# Patient Record
Sex: FEMALE | Race: WHITE | Employment: UNEMPLOYED | URBAN - METROPOLITAN AREA
[De-identification: names, ages, dates, MRNs, and addresses within clinical notes are randomized per-mention and may not be internally consistent; named-entity substitution may affect disease eponyms.]

---

## 2021-08-31 ENCOUNTER — HOSPITAL ENCOUNTER (INPATIENT)
Age: 61
LOS: 1 days | Discharge: HOME HEALTH | DRG: 177 | End: 2021-09-02
Attending: EMERGENCY MEDICINE | Admitting: EMERGENCY MEDICINE
Payer: OTHER GOVERNMENT

## 2021-08-31 ENCOUNTER — APPOINTMENT (OUTPATIENT)
Dept: GENERAL RADIOLOGY | Age: 61
DRG: 177 | End: 2021-08-31
Attending: STUDENT IN AN ORGANIZED HEALTH CARE EDUCATION/TRAINING PROGRAM
Payer: OTHER GOVERNMENT

## 2021-08-31 ENCOUNTER — APPOINTMENT (OUTPATIENT)
Dept: CT IMAGING | Age: 61
DRG: 177 | End: 2021-08-31
Attending: STUDENT IN AN ORGANIZED HEALTH CARE EDUCATION/TRAINING PROGRAM
Payer: OTHER GOVERNMENT

## 2021-08-31 DIAGNOSIS — R41.82 ALTERED MENTAL STATUS, UNSPECIFIED ALTERED MENTAL STATUS TYPE: ICD-10-CM

## 2021-08-31 DIAGNOSIS — U07.1 COVID-19: Primary | ICD-10-CM

## 2021-08-31 LAB
ALBUMIN SERPL-MCNC: 3.1 G/DL (ref 3.2–4.6)
ALBUMIN/GLOB SERPL: 0.7 {RATIO} (ref 1.2–3.5)
ALP SERPL-CCNC: 55 U/L (ref 50–136)
ALT SERPL-CCNC: 83 U/L (ref 12–65)
ANION GAP SERPL CALC-SCNC: 8 MMOL/L (ref 7–16)
AST SERPL-CCNC: 52 U/L (ref 15–37)
BASOPHILS # BLD: 0 K/UL (ref 0–0.2)
BASOPHILS NFR BLD: 0 % (ref 0–2)
BILIRUB SERPL-MCNC: 0.6 MG/DL (ref 0.2–1.1)
BUN SERPL-MCNC: 11 MG/DL (ref 8–23)
CALCIUM SERPL-MCNC: 8.7 MG/DL (ref 8.3–10.4)
CHLORIDE SERPL-SCNC: 107 MMOL/L (ref 98–107)
CO2 SERPL-SCNC: 23 MMOL/L (ref 21–32)
COVID-19 RAPID TEST, COVR: DETECTED
CREAT SERPL-MCNC: 0.8 MG/DL (ref 0.6–1)
DIFFERENTIAL METHOD BLD: NORMAL
EOSINOPHIL # BLD: 0.2 K/UL (ref 0–0.8)
EOSINOPHIL NFR BLD: 3 % (ref 0.5–7.8)
ERYTHROCYTE [DISTWIDTH] IN BLOOD BY AUTOMATED COUNT: 12.7 % (ref 11.9–14.6)
GLOBULIN SER CALC-MCNC: 4.4 G/DL (ref 2.3–3.5)
GLUCOSE BLD STRIP.AUTO-MCNC: 124 MG/DL (ref 65–100)
GLUCOSE SERPL-MCNC: 120 MG/DL (ref 65–100)
HCT VFR BLD AUTO: 40.8 % (ref 35.8–46.3)
HGB BLD-MCNC: 14 G/DL (ref 11.7–15.4)
IMM GRANULOCYTES # BLD AUTO: 0.1 K/UL (ref 0–0.5)
IMM GRANULOCYTES NFR BLD AUTO: 2 % (ref 0–5)
LACTATE SERPL-SCNC: 3.1 MMOL/L (ref 0.4–2)
LYMPHOCYTES # BLD: 1.1 K/UL (ref 0.5–4.6)
LYMPHOCYTES NFR BLD: 20 % (ref 13–44)
MCH RBC QN AUTO: 30.6 PG (ref 26.1–32.9)
MCHC RBC AUTO-ENTMCNC: 34.3 G/DL (ref 31.4–35)
MCV RBC AUTO: 89.3 FL (ref 79.6–97.8)
MONOCYTES # BLD: 0.4 K/UL (ref 0.1–1.3)
MONOCYTES NFR BLD: 7 % (ref 4–12)
NEUTS SEG # BLD: 3.6 K/UL (ref 1.7–8.2)
NEUTS SEG NFR BLD: 68 % (ref 43–78)
NRBC # BLD: 0 K/UL (ref 0–0.2)
PLATELET # BLD AUTO: 396 K/UL (ref 150–450)
PMV BLD AUTO: 10.3 FL (ref 9.4–12.3)
POTASSIUM SERPL-SCNC: 3 MMOL/L (ref 3.5–5.1)
PROT SERPL-MCNC: 7.5 G/DL (ref 6.3–8.2)
RBC # BLD AUTO: 4.57 M/UL (ref 4.05–5.2)
SARS-COV-2, COV2: NORMAL
SERVICE CMNT-IMP: ABNORMAL
SODIUM SERPL-SCNC: 138 MMOL/L (ref 136–145)
SOURCE, COVRS: ABNORMAL
TSH SERPL DL<=0.005 MIU/L-ACNC: 2.25 UIU/ML (ref 0.36–3.74)
WBC # BLD AUTO: 5.3 K/UL (ref 4.3–11.1)

## 2021-08-31 PROCEDURE — 80053 COMPREHEN METABOLIC PANEL: CPT

## 2021-08-31 PROCEDURE — 84145 PROCALCITONIN (PCT): CPT

## 2021-08-31 PROCEDURE — 83605 ASSAY OF LACTIC ACID: CPT

## 2021-08-31 PROCEDURE — 71045 X-RAY EXAM CHEST 1 VIEW: CPT

## 2021-08-31 PROCEDURE — 74011250636 HC RX REV CODE- 250/636: Performed by: EMERGENCY MEDICINE

## 2021-08-31 PROCEDURE — 81003 URINALYSIS AUTO W/O SCOPE: CPT

## 2021-08-31 PROCEDURE — 93005 ELECTROCARDIOGRAM TRACING: CPT | Performed by: STUDENT IN AN ORGANIZED HEALTH CARE EDUCATION/TRAINING PROGRAM

## 2021-08-31 PROCEDURE — 87635 SARS-COV-2 COVID-19 AMP PRB: CPT

## 2021-08-31 PROCEDURE — 70450 CT HEAD/BRAIN W/O DYE: CPT

## 2021-08-31 PROCEDURE — 93005 ELECTROCARDIOGRAM TRACING: CPT | Performed by: EMERGENCY MEDICINE

## 2021-08-31 PROCEDURE — 96374 THER/PROPH/DIAG INJ IV PUSH: CPT

## 2021-08-31 PROCEDURE — 99284 EMERGENCY DEPT VISIT MOD MDM: CPT

## 2021-08-31 PROCEDURE — 85025 COMPLETE CBC W/AUTO DIFF WBC: CPT

## 2021-08-31 PROCEDURE — 96365 THER/PROPH/DIAG IV INF INIT: CPT

## 2021-08-31 PROCEDURE — 82962 GLUCOSE BLOOD TEST: CPT

## 2021-08-31 PROCEDURE — 87040 BLOOD CULTURE FOR BACTERIA: CPT

## 2021-08-31 PROCEDURE — 84443 ASSAY THYROID STIM HORMONE: CPT

## 2021-08-31 RX ORDER — TRAZODONE HYDROCHLORIDE 50 MG/1
TABLET ORAL
COMMUNITY
Start: 2021-07-12

## 2021-08-31 RX ORDER — DEXAMETHASONE SODIUM PHOSPHATE 100 MG/10ML
6 INJECTION INTRAMUSCULAR; INTRAVENOUS
Status: COMPLETED | OUTPATIENT
Start: 2021-08-31 | End: 2021-08-31

## 2021-08-31 RX ORDER — PAROXETINE HYDROCHLORIDE 20 MG/1
20 TABLET, FILM COATED ORAL DAILY
COMMUNITY

## 2021-08-31 RX ORDER — LOSARTAN POTASSIUM 50 MG/1
50 TABLET ORAL DAILY
COMMUNITY
Start: 2021-07-12 | End: 2021-09-02

## 2021-08-31 RX ORDER — CELECOXIB 100 MG/1
CAPSULE ORAL
COMMUNITY
Start: 2021-07-12

## 2021-08-31 RX ORDER — LEVOTHYROXINE SODIUM 88 UG/1
TABLET ORAL
COMMUNITY
Start: 2021-07-12

## 2021-08-31 RX ORDER — HYDROCHLOROTHIAZIDE 25 MG/1
25 TABLET ORAL DAILY
COMMUNITY
Start: 2021-07-12 | End: 2021-09-02

## 2021-08-31 RX ORDER — METOCLOPRAMIDE 10 MG/1
10 TABLET ORAL
COMMUNITY

## 2021-08-31 RX ORDER — OMEPRAZOLE 40 MG/1
40 CAPSULE, DELAYED RELEASE ORAL DAILY
COMMUNITY
Start: 2021-07-12

## 2021-08-31 RX ORDER — ESTRADIOL 1 MG/1
1 TABLET ORAL DAILY
COMMUNITY

## 2021-08-31 RX ADMIN — DEXAMETHASONE SODIUM PHOSPHATE 6 MG: 10 INJECTION INTRAMUSCULAR; INTRAVENOUS at 23:59

## 2021-08-31 RX ADMIN — SODIUM CHLORIDE 1000 ML: 900 INJECTION, SOLUTION INTRAVENOUS at 23:58

## 2021-08-31 NOTE — ED PROVIDER NOTES
Provider in triage: 79-year-old female patient brought in for evaluation by her daughter who reports altered mentation starting yesterday. Patient was last seen acting normally yesterday morning. Around lunchtime she was evaluated by family again where she appeared confused. She has been suffering from cough, congestion, nausea, intermittent stomach cramping with concern for COVID-19. She has had fevers off and on here recently. She lives with her son who is currently Covid positive. Patient's confusion progressive today concerning family for stroke and prompting their visit to this department. Patient is able to give her name at this time, family states speech is normal.  Patient denies numbness tingling or weakness at this time. She reports generalized ill feeling. Poor historian. Started greater than 24 hours ago. She has no evidence of signs of stroke on exam.  We will proceed with altered mentation work-up, PCR/rapid testing as she may be a candidate for Regeneron. The history is provided by the patient and a relative. No  was used. Altered mental status   This is a new problem. The current episode started yesterday. The problem has been gradually worsening. Associated symptoms include confusion and weakness. Pertinent negatives include no numbness. Mental status baseline is normal.  Her past medical history is significant for hypertension. Her past medical history does not include diabetes or heart disease. No past medical history on file. No past surgical history on file. No family history on file.     Social History     Socioeconomic History    Marital status: Not on file     Spouse name: Not on file    Number of children: Not on file    Years of education: Not on file    Highest education level: Not on file   Occupational History    Not on file   Tobacco Use    Smoking status: Not on file   Substance and Sexual Activity    Alcohol use: Not on file    Drug use: Not on file    Sexual activity: Not on file   Other Topics Concern    Not on file   Social History Narrative    Not on file     Social Determinants of Health     Financial Resource Strain:     Difficulty of Paying Living Expenses:    Food Insecurity:     Worried About Running Out of Food in the Last Year:     [address] in the Last Year:    Transportation Needs:     Lack of Transportation (Medical):  Lack of Transportation (Non-Medical):    Physical Activity:     Days of Exercise per Week:     Minutes of Exercise per Session:    Stress:     Feeling of Stress :    Social Connections:     Frequency of Communication with Friends and Family:     Frequency of Social Gatherings with Friends and Family:     Attends Protestant Services:     Active Member of Clubs or Organizations:     Attends Club or Organization Meetings:     Marital Status:    Intimate Partner Violence:     Fear of Current or Ex-Partner:     Emotionally Abused:     Physically Abused:     Sexually Abused: ALLERGIES: Patient has no known allergies. Review of Systems   Constitutional: Positive for fatigue. Negative for chills and fever. HENT: Positive for congestion. Negative for rhinorrhea and sore throat. Eyes: Negative for pain and redness. Respiratory: Positive for cough and shortness of breath. Negative for chest tightness and wheezing. Cardiovascular: Negative for chest pain and leg swelling. Gastrointestinal: Positive for diarrhea, nausea and vomiting. Negative for abdominal pain. Genitourinary: Positive for dysuria. Negative for hematuria. Musculoskeletal: Positive for myalgias. Negative for back pain, gait problem, neck pain and neck stiffness. Skin: Negative for color change and rash. Neurological: Positive for weakness. Negative for numbness and headaches. Psychiatric/Behavioral: Positive for confusion. All other systems reviewed and are negative.       Vitals:    08/31/21 1702   BP: (!) 141/92   Pulse: 94   Resp: 18   Temp: 97.8 °F (36.6 °C)   SpO2: 97%   Weight: 104.3 kg (230 lb)   Height: 5' 8\" (1.727 m)            Physical Exam  Constitutional:       Appearance: She is well-developed. HENT:      Head: Normocephalic and atraumatic. Eyes:      Extraocular Movements: Extraocular movements intact. Pupils: Pupils are equal, round, and reactive to light. Cardiovascular:      Rate and Rhythm: Normal rate and regular rhythm. Pulmonary:      Effort: Pulmonary effort is normal. No respiratory distress. Breath sounds: Normal breath sounds. No wheezing. Abdominal:      General: Bowel sounds are normal.      Palpations: Abdomen is soft. Tenderness: There is no abdominal tenderness. Musculoskeletal:         General: No swelling. Normal range of motion. Cervical back: Normal range of motion and neck supple. Skin:     General: Skin is warm and dry. Neurological:      Mental Status: She is alert. She is disoriented. Cranial Nerves: No cranial nerve deficit. Motor: No weakness. MDM  Number of Diagnoses or Management Options  Diagnosis management comments: Patient with Covid type symptoms since Friday. Has gotten worse over the weekend with some slight confusion yesterday much worse today. Has some weakness with it. Has nausea vomiting and diarrhea. No hypoxia. Will admit for Covid and altered mental status. Amount and/or Complexity of Data Reviewed  Clinical lab tests: ordered and reviewed  Tests in the radiology section of CPT®: ordered and reviewed  Tests in the medicine section of CPT®: ordered and reviewed    Patient Progress  Patient progress: stable         Procedures    EKG: normal sinus rhythm, nonspecific ST and T waves changes. Rate 94.         CT HEAD WO CONT (Final result)  Result time 08/31/21 18:00:47  Final result by Marcus Madison MD (08/31/21 17:55:47)                Impression:    No acute findings Narrative:    HEAD CT WITHOUT CONTRAST  8/31/2021     HISTORY: Confusion and sluggish response yesterday at noon. TECHNIQUE: Noncontrast axial images were obtained through the brain.  All CT   scans at this facility used dose modulation, interactive reconstruction and/or   weight based dosing when appropriate to reduce radiation dose to as low as   reasonably achievable. COMPARISON: None     FINDINGS: There is no acute intracranial hemorrhage, significant mass effect or   CT evidence of acute large artery territorial infarction. Please note that a   hyperacute infarct or small vessel infarct may not be apparent on initial CT   imaging. Mild cerebral volume loss is present. There is no hydrocephalus , intra-axial mass or abnormal extra-axial fluid   collection. There are no displaced skull fractures. The mastoid air cells and   paranasal sinuses are clear where imaged.                     XR CHEST PORT (Final result)  Result time 08/31/21 18:03:46  Final result by Delia Sandoval MD (08/31/21 18:03:46)                Impression:    Low lung volumes with bilateral infiltrates. Narrative:    PORTABLE CHEST X-RAY 8/31/2021 6:03 PM     HISTORY: AMS/confusion     COMPARISON: None available     FINDINGS: The lung volumes are diminished. Patchy peripheral bilateral   infiltrates are present.  Pleural spaces are clear.                   Results Include:    Recent Results (from the past 24 hour(s))   GLUCOSE, POC    Collection Time: 08/31/21  5:01 PM   Result Value Ref Range    Glucose (POC) 124 (H) 65 - 100 mg/dL    Performed by Valentín    CBC WITH AUTOMATED DIFF    Collection Time: 08/31/21  5:08 PM   Result Value Ref Range    WBC 5.3 4.3 - 11.1 K/uL    RBC 4.57 4.05 - 5.2 M/uL    HGB 14.0 11.7 - 15.4 g/dL    HCT 40.8 35.8 - 46.3 %    MCV 89.3 79.6 - 97.8 FL    MCH 30.6 26.1 - 32.9 PG    MCHC 34.3 31.4 - 35.0 g/dL    RDW 12.7 11.9 - 14.6 %    PLATELET 704 158 - 484 K/uL    MPV 10.3 9.4 - 12.3 FL    ABSOLUTE NRBC 0.00 0.0 - 0.2 K/uL    DF AUTOMATED      NEUTROPHILS 68 43 - 78 %    LYMPHOCYTES 20 13 - 44 %    MONOCYTES 7 4.0 - 12.0 %    EOSINOPHILS 3 0.5 - 7.8 %    BASOPHILS 0 0.0 - 2.0 %    IMMATURE GRANULOCYTES 2 0.0 - 5.0 %    ABS. NEUTROPHILS 3.6 1.7 - 8.2 K/UL    ABS. LYMPHOCYTES 1.1 0.5 - 4.6 K/UL    ABS. MONOCYTES 0.4 0.1 - 1.3 K/UL    ABS. EOSINOPHILS 0.2 0.0 - 0.8 K/UL    ABS. BASOPHILS 0.0 0.0 - 0.2 K/UL    ABS. IMM. GRANS. 0.1 0.0 - 0.5 K/UL   METABOLIC PANEL, COMPREHENSIVE    Collection Time: 08/31/21  5:08 PM   Result Value Ref Range    Sodium 138 136 - 145 mmol/L    Potassium 3.0 (L) 3.5 - 5.1 mmol/L    Chloride 107 98 - 107 mmol/L    CO2 23 21 - 32 mmol/L    Anion gap 8 7 - 16 mmol/L    Glucose 120 (H) 65 - 100 mg/dL    BUN 11 8 - 23 MG/DL    Creatinine 0.80 0.6 - 1.0 MG/DL    GFR est AA >60 >60 ml/min/1.73m2    GFR est non-AA >60 >60 ml/min/1.73m2    Calcium 8.7 8.3 - 10.4 MG/DL    Bilirubin, total 0.6 0.2 - 1.1 MG/DL    ALT (SGPT) 83 (H) 12 - 65 U/L    AST (SGOT) 52 (H) 15 - 37 U/L    Alk.  phosphatase 55 50 - 136 U/L    Protein, total 7.5 6.3 - 8.2 g/dL    Albumin 3.1 (L) 3.2 - 4.6 g/dL    Globulin 4.4 (H) 2.3 - 3.5 g/dL    A-G Ratio 0.7 (L) 1.2 - 3.5     TSH 3RD GENERATION    Collection Time: 08/31/21  5:08 PM   Result Value Ref Range    TSH 2.250 0.358 - 3.740 uIU/mL   LACTIC ACID    Collection Time: 08/31/21  5:08 PM   Result Value Ref Range    Lactic acid 3.1 (H) 0.4 - 2.0 MMOL/L   SARS-COV-2    Collection Time: 08/31/21  5:08 PM   Result Value Ref Range    SARS-CoV-2 Please find results under separate order     COVID-19 RAPID TEST    Collection Time: 08/31/21  5:08 PM   Result Value Ref Range    Specimen source Nasopharyngeal      COVID-19 rapid test Detected (AA) NOTD     EKG, 12 LEAD, INITIAL    Collection Time: 08/31/21  5:10 PM   Result Value Ref Range    Ventricular Rate 94 BPM    Atrial Rate 94 BPM    P-R Interval 152 ms    QRS Duration 96 ms    Q-T Interval 356 ms QTC Calculation (Bezet) 445 ms    Calculated P Axis 38 degrees    Calculated R Axis 68 degrees    Calculated T Axis 10 degrees    Diagnosis       Normal sinus rhythm  Nonspecific T wave abnormality  Abnormal ECG  No previous ECGs available

## 2021-09-01 PROBLEM — U07.1 PNEUMONIA DUE TO COVID-19 VIRUS: Status: ACTIVE | Noted: 2021-09-01

## 2021-09-01 PROBLEM — E87.6 HYPOKALEMIA: Status: ACTIVE | Noted: 2021-09-01

## 2021-09-01 PROBLEM — E87.20 LACTIC ACIDOSIS: Status: ACTIVE | Noted: 2021-09-01

## 2021-09-01 PROBLEM — J12.82 PNEUMONIA DUE TO COVID-19 VIRUS: Status: ACTIVE | Noted: 2021-09-01

## 2021-09-01 PROBLEM — I10 ESSENTIAL HYPERTENSION: Status: ACTIVE | Noted: 2021-09-01

## 2021-09-01 PROBLEM — G93.41 ACUTE METABOLIC ENCEPHALOPATHY: Status: ACTIVE | Noted: 2021-09-01

## 2021-09-01 PROBLEM — R41.82 ALTERED MENTAL STATUS: Status: ACTIVE | Noted: 2021-09-01

## 2021-09-01 LAB
ATRIAL RATE: 94 BPM
CALCULATED P AXIS, ECG09: 38 DEGREES
CALCULATED R AXIS, ECG10: 68 DEGREES
CALCULATED T AXIS, ECG11: 10 DEGREES
DIAGNOSIS, 93000: NORMAL
LACTATE SERPL-SCNC: 1.3 MMOL/L (ref 0.4–2)
P-R INTERVAL, ECG05: 152 MS
PROCALCITONIN SERPL-MCNC: <0.05 NG/ML
Q-T INTERVAL, ECG07: 356 MS
QRS DURATION, ECG06: 96 MS
QTC CALCULATION (BEZET), ECG08: 445 MS
VENTRICULAR RATE, ECG03: 94 BPM

## 2021-09-01 PROCEDURE — 97162 PT EVAL MOD COMPLEX 30 MIN: CPT

## 2021-09-01 PROCEDURE — 74011000258 HC RX REV CODE- 258: Performed by: EMERGENCY MEDICINE

## 2021-09-01 PROCEDURE — 87086 URINE CULTURE/COLONY COUNT: CPT

## 2021-09-01 PROCEDURE — 74011250637 HC RX REV CODE- 250/637: Performed by: FAMILY MEDICINE

## 2021-09-01 PROCEDURE — 65270000029 HC RM PRIVATE

## 2021-09-01 PROCEDURE — 74011250636 HC RX REV CODE- 250/636: Performed by: EMERGENCY MEDICINE

## 2021-09-01 PROCEDURE — 97165 OT EVAL LOW COMPLEX 30 MIN: CPT

## 2021-09-01 PROCEDURE — 97535 SELF CARE MNGMENT TRAINING: CPT

## 2021-09-01 PROCEDURE — 74011250637 HC RX REV CODE- 250/637: Performed by: EMERGENCY MEDICINE

## 2021-09-01 PROCEDURE — 97530 THERAPEUTIC ACTIVITIES: CPT

## 2021-09-01 RX ORDER — PANTOPRAZOLE SODIUM 40 MG/1
40 TABLET, DELAYED RELEASE ORAL
Status: DISCONTINUED | OUTPATIENT
Start: 2021-09-01 | End: 2021-09-02 | Stop reason: HOSPADM

## 2021-09-01 RX ORDER — POTASSIUM CHLORIDE 20 MEQ/1
40 TABLET, EXTENDED RELEASE ORAL EVERY 4 HOURS
Status: COMPLETED | OUTPATIENT
Start: 2021-09-01 | End: 2021-09-01

## 2021-09-01 RX ORDER — HYDRALAZINE HYDROCHLORIDE 20 MG/ML
10 INJECTION INTRAMUSCULAR; INTRAVENOUS
Status: DISCONTINUED | OUTPATIENT
Start: 2021-09-01 | End: 2021-09-02 | Stop reason: HOSPADM

## 2021-09-01 RX ORDER — POLYETHYLENE GLYCOL 3350 17 G/17G
17 POWDER, FOR SOLUTION ORAL DAILY PRN
Status: DISCONTINUED | OUTPATIENT
Start: 2021-09-01 | End: 2021-09-02 | Stop reason: HOSPADM

## 2021-09-01 RX ORDER — SODIUM CHLORIDE 9 MG/ML
75 INJECTION, SOLUTION INTRAVENOUS CONTINUOUS
Status: DISCONTINUED | OUTPATIENT
Start: 2021-09-01 | End: 2021-09-02 | Stop reason: HOSPADM

## 2021-09-01 RX ORDER — ONDANSETRON 2 MG/ML
4 INJECTION INTRAMUSCULAR; INTRAVENOUS
Status: DISCONTINUED | OUTPATIENT
Start: 2021-09-01 | End: 2021-09-02 | Stop reason: HOSPADM

## 2021-09-01 RX ORDER — ENOXAPARIN SODIUM 100 MG/ML
30 INJECTION SUBCUTANEOUS EVERY 12 HOURS
Status: DISCONTINUED | OUTPATIENT
Start: 2021-09-01 | End: 2021-09-02 | Stop reason: HOSPADM

## 2021-09-01 RX ORDER — HYDROCHLOROTHIAZIDE 25 MG/1
25 TABLET ORAL DAILY
Status: DISCONTINUED | OUTPATIENT
Start: 2021-09-01 | End: 2021-09-02 | Stop reason: HOSPADM

## 2021-09-01 RX ORDER — GUAIFENESIN 100 MG/5ML
81 LIQUID (ML) ORAL DAILY
Status: DISCONTINUED | OUTPATIENT
Start: 2021-09-01 | End: 2021-09-02 | Stop reason: HOSPADM

## 2021-09-01 RX ORDER — SODIUM CHLORIDE 0.9 % (FLUSH) 0.9 %
5-40 SYRINGE (ML) INJECTION EVERY 8 HOURS
Status: DISCONTINUED | OUTPATIENT
Start: 2021-09-01 | End: 2021-09-02 | Stop reason: HOSPADM

## 2021-09-01 RX ORDER — CELECOXIB 100 MG/1
100 CAPSULE ORAL DAILY
Status: DISCONTINUED | OUTPATIENT
Start: 2021-09-01 | End: 2021-09-01

## 2021-09-01 RX ORDER — ONDANSETRON 4 MG/1
4 TABLET, ORALLY DISINTEGRATING ORAL
Status: DISCONTINUED | OUTPATIENT
Start: 2021-09-01 | End: 2021-09-02 | Stop reason: HOSPADM

## 2021-09-01 RX ORDER — METOCLOPRAMIDE 10 MG/1
10 TABLET ORAL
Status: DISCONTINUED | OUTPATIENT
Start: 2021-09-01 | End: 2021-09-02 | Stop reason: HOSPADM

## 2021-09-01 RX ORDER — ACETAMINOPHEN 650 MG/1
650 SUPPOSITORY RECTAL
Status: DISCONTINUED | OUTPATIENT
Start: 2021-09-01 | End: 2021-09-02 | Stop reason: HOSPADM

## 2021-09-01 RX ORDER — MELATONIN
2000 DAILY
Status: DISCONTINUED | OUTPATIENT
Start: 2021-09-01 | End: 2021-09-02 | Stop reason: HOSPADM

## 2021-09-01 RX ORDER — ESTRADIOL 1 MG/1
1 TABLET ORAL DAILY
Status: DISCONTINUED | OUTPATIENT
Start: 2021-09-01 | End: 2021-09-02 | Stop reason: HOSPADM

## 2021-09-01 RX ORDER — LOSARTAN POTASSIUM 50 MG/1
50 TABLET ORAL DAILY
Status: DISCONTINUED | OUTPATIENT
Start: 2021-09-01 | End: 2021-09-02 | Stop reason: HOSPADM

## 2021-09-01 RX ORDER — LEVOTHYROXINE SODIUM 88 UG/1
88 TABLET ORAL
Status: DISCONTINUED | OUTPATIENT
Start: 2021-09-01 | End: 2021-09-02 | Stop reason: HOSPADM

## 2021-09-01 RX ORDER — LANOLIN ALCOHOL/MO/W.PET/CERES
400 CREAM (GRAM) TOPICAL 2 TIMES DAILY
Status: DISCONTINUED | OUTPATIENT
Start: 2021-09-01 | End: 2021-09-02 | Stop reason: HOSPADM

## 2021-09-01 RX ORDER — ASPIRIN 300 MG/1
300 SUPPOSITORY RECTAL DAILY
Status: DISCONTINUED | OUTPATIENT
Start: 2021-09-01 | End: 2021-09-01

## 2021-09-01 RX ORDER — TRAZODONE HYDROCHLORIDE 50 MG/1
50 TABLET ORAL
Status: DISCONTINUED | OUTPATIENT
Start: 2021-09-01 | End: 2021-09-02 | Stop reason: HOSPADM

## 2021-09-01 RX ORDER — ACETAMINOPHEN 325 MG/1
650 TABLET ORAL
Status: DISCONTINUED | OUTPATIENT
Start: 2021-09-01 | End: 2021-09-02 | Stop reason: HOSPADM

## 2021-09-01 RX ORDER — SODIUM CHLORIDE 0.9 % (FLUSH) 0.9 %
5-40 SYRINGE (ML) INJECTION AS NEEDED
Status: DISCONTINUED | OUTPATIENT
Start: 2021-09-01 | End: 2021-09-02 | Stop reason: HOSPADM

## 2021-09-01 RX ORDER — PAROXETINE HYDROCHLORIDE 20 MG/1
20 TABLET, FILM COATED ORAL DAILY
Status: DISCONTINUED | OUTPATIENT
Start: 2021-09-01 | End: 2021-09-02 | Stop reason: HOSPADM

## 2021-09-01 RX ADMIN — Medication 10 ML: at 13:16

## 2021-09-01 RX ADMIN — METOCLOPRAMIDE 10 MG: 10 TABLET ORAL at 08:34

## 2021-09-01 RX ADMIN — CELECOXIB 100 MG: 100 CAPSULE ORAL at 08:33

## 2021-09-01 RX ADMIN — CEFTRIAXONE 2 G: 2 INJECTION, POWDER, FOR SOLUTION INTRAMUSCULAR; INTRAVENOUS at 00:02

## 2021-09-01 RX ADMIN — LOSARTAN POTASSIUM 50 MG: 50 TABLET, FILM COATED ORAL at 08:33

## 2021-09-01 RX ADMIN — SODIUM CHLORIDE 75 ML/HR: 900 INJECTION, SOLUTION INTRAVENOUS at 07:33

## 2021-09-01 RX ADMIN — ASPIRIN 81 MG: 81 TABLET, CHEWABLE ORAL at 12:09

## 2021-09-01 RX ADMIN — PAROXETINE HYDROCHLORIDE 20 MG: 20 TABLET, FILM COATED ORAL at 08:34

## 2021-09-01 RX ADMIN — POTASSIUM CHLORIDE 40 MEQ: 20 TABLET, EXTENDED RELEASE ORAL at 12:09

## 2021-09-01 RX ADMIN — ESTRADIOL 1 MG: 1 TABLET ORAL at 08:33

## 2021-09-01 RX ADMIN — METOCLOPRAMIDE 10 MG: 10 TABLET ORAL at 21:18

## 2021-09-01 RX ADMIN — ENOXAPARIN SODIUM 30 MG: 30 INJECTION SUBCUTANEOUS at 08:35

## 2021-09-01 RX ADMIN — Medication 10 ML: at 07:33

## 2021-09-01 RX ADMIN — POTASSIUM CHLORIDE 40 MEQ: 20 TABLET, EXTENDED RELEASE ORAL at 10:41

## 2021-09-01 RX ADMIN — CEFTRIAXONE 1 G: 1 INJECTION, POWDER, FOR SOLUTION INTRAMUSCULAR; INTRAVENOUS at 22:59

## 2021-09-01 RX ADMIN — PANTOPRAZOLE SODIUM 40 MG: 40 TABLET, DELAYED RELEASE ORAL at 08:34

## 2021-09-01 RX ADMIN — ENOXAPARIN SODIUM 30 MG: 30 INJECTION SUBCUTANEOUS at 21:43

## 2021-09-01 RX ADMIN — METOCLOPRAMIDE 10 MG: 10 TABLET ORAL at 12:09

## 2021-09-01 RX ADMIN — VITAMIN D, TAB 1000IU (100/BT) 2000 UNITS: 25 TAB at 08:33

## 2021-09-01 RX ADMIN — METOCLOPRAMIDE 10 MG: 10 TABLET ORAL at 17:14

## 2021-09-01 RX ADMIN — SODIUM CHLORIDE 75 ML/HR: 900 INJECTION, SOLUTION INTRAVENOUS at 21:21

## 2021-09-01 RX ADMIN — LEVOTHYROXINE SODIUM 88 MCG: 0.09 TABLET ORAL at 08:34

## 2021-09-01 RX ADMIN — Medication 10 ML: at 21:18

## 2021-09-01 RX ADMIN — Medication 400 MG: at 17:14

## 2021-09-01 NOTE — ASSESSMENT & PLAN NOTE
Patient appears to be altered. It is unclear what the cause is. Patient does have Covid pneumonia without hypoxia so there is a chance that this could be caused the altered mental status or just related to Covid in general.  Patient also appeared somewhat dehydrated clinically all the labs did not bear that out. Dehydration could be contributing to altered mental status. UA was negative for infectious source. Urine culture sent. Could be altered secondary to lactic acidosis. IV fluids given. Will monitor closely and adjust treatment as necessary. If persist, could get MRI later in the day. CT head is negative for acute findings.

## 2021-09-01 NOTE — ED NOTES
Per Daughter \"We thought it was just the COVID but yesterday confusion started kicking in.   She is usually alert and oriented and all over the place but yesterday it she started getting confused but today she is not answering questions, and staring in space\"

## 2021-09-01 NOTE — H&P
History and Physical        Hospitalist History and Physical   Admit Date:  2021 10:54 PM   Name:  Jeannie Ortiz   Age:  62 y.o. Sex:  female  :  1960   MRN:  [id number]     Presenting Complaint: Concern For COVID-19 (Coronavirus) and Altered mental status    Reason(s) for Admission: Altered mental status in setting of not hypoxic Covid pneumonia and lactic acidosis. History of Present Illness:   Jeannie Ortiz is a 62 y.o. female with medical history of obesity and hypertension who presented with report of 5 days of intermittent cough, nausea, diarrhea, loss of sense of taste and smell, with altered mental status that started yesterday and has worsened over the course the last 24 hours. She denies fever, chills, current body aches, vomiting, abdominal pain, chest pain, constipation, focal weakness or numbness. Review of Systems:  10 systems reviewed and negative except as noted in HPI. Assessment & Plan: Altered mental status  Patient appears to be altered. It is unclear what the cause is. Patient does have Covid pneumonia without hypoxia so there is a chance that this could be caused the altered mental status or just related to Covid in general.  Patient also appeared somewhat dehydrated clinically all the labs did not bear that out. Dehydration could be contributing to altered mental status. UA was negative for infectious source. Urine culture sent. Could be altered secondary to lactic acidosis. IV fluids given. Will monitor closely and adjust treatment as necessary. If persist, could get MRI later in the day. CT head is negative for acute findings. Pneumonia due to COVID-19 virus  Not currently hypoxic, short of breath, having any difficulty breathing. Will monitor closely and adjust treatment as necessary. Lactic acidosis  No obvious source of infection. Rocephin given. Urine culture pending. Will monitor closely and adjust treatment as necessary.   Of note, point-of-care urine was done in the emergency department and it was negative with exception of mild ketones. Essential hypertension  Continue hydrochlorothiazide losartan. Monitor closely and adjust treatment as necessary. Dispo/Discharge Plannin-2 midnights. Diet: Heart healthy diet. VTE ppx: Lovenox. Code status: Full code. Active Hospital Problems    Diagnosis Date Noted    Altered mental status 2021     Priority: 1 - One    Pneumonia due to COVID-19 virus 2021     Priority: 2 - Two    Lactic acidosis 2021     Priority: 3 - Three    Essential hypertension 2021     Priority: 4 - Four       Past Medical History:   Diagnosis Date    Hypertension      History reviewed. No pertinent surgical history. No Known Allergies   Social History     Tobacco Use    Smoking status: Never Smoker    Smokeless tobacco: Never Used   Substance Use Topics    Alcohol use: Not Currently      History reviewed. No pertinent family history. Family history reviewed and negative except as noted above. There is no immunization history on file for this patient.   PTA Medications:  Current Outpatient Medications   Medication Instructions    celecoxib (CELEBREX) 100 mg capsule TAKE 1 Capsule BY MOUTH EVERY DAY WITH FOOD    estradioL (ESTRACE) 1 mg, Oral, DAILY    hydroCHLOROthiazide (HYDRODIURIL) 25 mg, Oral, DAILY    losartan (COZAAR) 50 mg, Oral, DAILY    metoclopramide HCl (REGLAN) 10 mg, Oral, 4 TIMES DAILY BEFORE MEALS & NIGHTLY    omeprazole (PRILOSEC) 40 mg, Oral, DAILY    PARoxetine (PAXIL) 20 mg, Oral, DAILY    Synthroid 88 mcg tablet TAKE 1 TABLET BY MOUTH EVERY DAY (DOSE CHANGE)    traZODone (DESYREL) 50 mg tablet TAKE 1 TO 2 TABLETS BY MOUTH EVERY NIGHT AT BEDTIME AS NEEDED FOR SLEEP       Objective:     Patient Vitals for the past 24 hrs:   Temp Pulse Resp BP SpO2   21 1702 97.8 °F (36.6 °C) 94 18 (!) 141/92 97 %     Oxygen Therapy  O2 Sat (%): 97 % (08/31/21 1702)  O2 Device: None (Room air) (08/31/21 1702)    Estimated body mass index is 34.97 kg/m² as calculated from the following:    Height as of this encounter: 5' 8\" (1.727 m). Weight as of this encounter: 104.3 kg (230 lb). No intake or output data in the 24 hours ending 09/01/21 0252      Physical Exam:    General:    Well nourished. No overt distress. Lying on stretcher in no acute distress. Nontoxic-appearing. Not ill-appearing. Noted to be pleasantly confused. Unable to tell me that she is in the hospital, that she is at [address] [place] [address], and certainly unable to tolerate she does answer to her name. Head:  Normocephalic, atraumatic  Eyes:  Sclerae appear normal.  Pupils equally round. ENT:  Nares appear normal, no drainage. Moist oral mucosa  Neck:  No restricted ROM. Trachea midline   CV:   RRR. No m/r/g. No jugular venous distension. Lungs:   Bibasilar rales noted. Good air movement throughout. No wheezing or rhonchi noted. Respirations even, unlabored  Abdomen: Bowel sounds present. Soft, nontender, nondistended. Extremities: No cyanosis or clubbing. No edema  Skin:     No rashes and normal coloration. Warm and dry. Neuro:  Cranial nerves II-XII grossly intact. Sensation intact  Psych:  Normal mood and affect. Alert and oriented x3.     I have reviewed ordered lab tests and independently visualized imaging below:    Last 24hr Labs:  Recent Results (from the past 24 hour(s))   GLUCOSE, POC    Collection Time: 08/31/21  5:01 PM   Result Value Ref Range    Glucose (POC) 124 (H) 65 - 100 mg/dL    Performed by Valentín    CBC WITH AUTOMATED DIFF    Collection Time: 08/31/21  5:08 PM   Result Value Ref Range    WBC 5.3 4.3 - 11.1 K/uL    RBC 4.57 4.05 - 5.2 M/uL    HGB 14.0 11.7 - 15.4 g/dL    HCT 40.8 35.8 - 46.3 %    MCV 89.3 79.6 - 97.8 FL    MCH 30.6 26.1 - 32.9 PG    MCHC 34.3 31.4 - 35.0 g/dL    RDW 12.7 11.9 - 14.6 %    PLATELET 012 249 - 347 K/uL    MPV 10.3 9.4 - 12.3 FL    ABSOLUTE NRBC 0.00 0.0 - 0.2 K/uL    DF AUTOMATED      NEUTROPHILS 68 43 - 78 %    LYMPHOCYTES 20 13 - 44 %    MONOCYTES 7 4.0 - 12.0 %    EOSINOPHILS 3 0.5 - 7.8 %    BASOPHILS 0 0.0 - 2.0 %    IMMATURE GRANULOCYTES 2 0.0 - 5.0 %    ABS. NEUTROPHILS 3.6 1.7 - 8.2 K/UL    ABS. LYMPHOCYTES 1.1 0.5 - 4.6 K/UL    ABS. MONOCYTES 0.4 0.1 - 1.3 K/UL    ABS. EOSINOPHILS 0.2 0.0 - 0.8 K/UL    ABS. BASOPHILS 0.0 0.0 - 0.2 K/UL    ABS. IMM. GRANS. 0.1 0.0 - 0.5 K/UL   METABOLIC PANEL, COMPREHENSIVE    Collection Time: 08/31/21  5:08 PM   Result Value Ref Range    Sodium 138 136 - 145 mmol/L    Potassium 3.0 (L) 3.5 - 5.1 mmol/L    Chloride 107 98 - 107 mmol/L    CO2 23 21 - 32 mmol/L    Anion gap 8 7 - 16 mmol/L    Glucose 120 (H) 65 - 100 mg/dL    BUN 11 8 - 23 MG/DL    Creatinine 0.80 0.6 - 1.0 MG/DL    GFR est AA >60 >60 ml/min/1.73m2    GFR est non-AA >60 >60 ml/min/1.73m2    Calcium 8.7 8.3 - 10.4 MG/DL    Bilirubin, total 0.6 0.2 - 1.1 MG/DL    ALT (SGPT) 83 (H) 12 - 65 U/L    AST (SGOT) 52 (H) 15 - 37 U/L    Alk.  phosphatase 55 50 - 136 U/L    Protein, total 7.5 6.3 - 8.2 g/dL    Albumin 3.1 (L) 3.2 - 4.6 g/dL    Globulin 4.4 (H) 2.3 - 3.5 g/dL    A-G Ratio 0.7 (L) 1.2 - 3.5     TSH 3RD GENERATION    Collection Time: 08/31/21  5:08 PM   Result Value Ref Range    TSH 2.250 0.358 - 3.740 uIU/mL   LACTIC ACID    Collection Time: 08/31/21  5:08 PM   Result Value Ref Range    Lactic acid 3.1 (H) 0.4 - 2.0 MMOL/L   SARS-COV-2    Collection Time: 08/31/21  5:08 PM   Result Value Ref Range    SARS-CoV-2 Please find results under separate order     COVID-19 RAPID TEST    Collection Time: 08/31/21  5:08 PM   Result Value Ref Range    Specimen source Nasopharyngeal      COVID-19 rapid test Detected (AA) NOTD     PROCALCITONIN    Collection Time: 08/31/21  5:08 PM   Result Value Ref Range    Procalcitonin <0.05 ng/mL   EKG, 12 LEAD, INITIAL    Collection Time: 08/31/21  5:10 PM   Result Value Ref Range    Ventricular Rate 94 BPM    Atrial Rate 94 BPM    P-R Interval 152 ms    QRS Duration 96 ms    Q-T Interval 356 ms    QTC Calculation (Bezet) 445 ms    Calculated P Axis 38 degrees    Calculated R Axis 68 degrees    Calculated T Axis 10 degrees    Diagnosis       Normal sinus rhythm  Nonspecific T wave abnormality  Abnormal ECG  No previous ECGs available     LACTIC ACID    Collection Time: 08/31/21 11:40 PM   Result Value Ref Range    Lactic acid 1.3 0.4 - 2.0 MMOL/L       All Micro Results     Procedure Component Value Units Date/Time    CULTURE, URINE [[id number]] Collected: 09/01/21 0010    Order Status: Completed Specimen: Clean catch Updated: 09/01/21 0038    CULTURE, BLOOD [[id number]] Collected: 08/31/21 2340    Order Status: Completed Specimen: Blood Updated: 08/31/21 2349    CULTURE, BLOOD [[id number]]     Order Status: Sent Specimen: Blood     COVID-19 RAPID TEST [[id number]]  (Abnormal) Collected: 08/31/21 1708    Order Status: Completed Specimen: Nasopharyngeal Updated: 08/31/21 1735     Specimen source Nasopharyngeal        COVID-19 rapid test Detected        Comment:      The specimen is POSITIVE for SARS-CoV-2, the novel coronavirus associated with COVID-19. This test has been authorized by the FDA under an Emergency Use Authorization (EUA) for use by authorized laboratories. Fact sheet for Healthcare Providers: [employer]  Fact sheet for Patients: [employer]       Methodology: Isothermal Nucleic Acid Amplification         COVID-19 RAPID TEST [[id number]] Collected: 08/31/21 1715    Order Status: Canceled           Other Studies:  CT HEAD WO CONT    Result Date: 8/31/2021  HEAD CT WITHOUT CONTRAST  8/31/2021 HISTORY: Confusion and sluggish response yesterday at noon. TECHNIQUE: Noncontrast axial images were obtained through the brain.   All CT scans at this facility used dose modulation, interactive reconstruction and/or weight based dosing when appropriate to reduce radiation dose to as low as reasonably achievable. COMPARISON: None FINDINGS: There is no acute intracranial hemorrhage, significant mass effect or CT evidence of acute large artery territorial infarction. Please note that a hyperacute infarct or small vessel infarct may not be apparent on initial CT imaging. Mild cerebral volume loss is present. There is no hydrocephalus , intra-axial mass or abnormal extra-axial fluid collection. There are no displaced skull fractures. The mastoid air cells and paranasal sinuses are clear where imaged. No acute findings     XR CHEST PORT    Result Date: 8/31/2021  PORTABLE CHEST X-RAY 8/31/2021 6:03 PM HISTORY: AMS/confusion COMPARISON: None available FINDINGS: The lung volumes are diminished. Patchy peripheral bilateral infiltrates are present. Pleural spaces are clear. Low lung volumes with bilateral infiltrates. Medications Administered     cefTRIAXone (ROCEPHIN) 2 g in 0.9% sodium chloride (MBP/ADV) 50 mL MBP     Admin Date  09/01/2021 Action  New Bag Dose  2 g Rate  100 mL/hr Route  IntraVENous Administered By  Clifford Mohan RN          dexamethasone (DECADRON) 10 mg/mL injection 6 mg     Admin Date  08/31/2021 Action  Given Dose  6 mg Route  IntraVENous Administered By  Clifford Mohan RN          sodium chloride 0.9 % bolus infusion 1,000 mL     Admin Date  08/31/2021 Action  New Bag Dose  1,000 mL Route  IntraVENous Administered By  Clifford Mohan RN                  Signed:  Waldemar Johnston MD    Part of this note may have been written by using a voice dictation software. The note has been proof read but may still contain some grammatical/other typographical errors.

## 2021-09-01 NOTE — PROGRESS NOTES
09/01/21 0605   Dual Skin Pressure Injury Assessment   Dual Skin Pressure Injury Assessment WDL  (Scratches on Buttocks)   Second Care Provider (Based on [facility]) Jluis Ellington, [address]

## 2021-09-01 NOTE — ROUTINE PROCESS
TRANSFER - OUT REPORT:    Verbal report given to Clarissa(name) on Evonne Valdes  being transferred to 6th floor(unit) for routine progression of care       Report consisted of patients Situation, Background, Assessment and   Recommendations(SBAR). Information from the following report(s) SBAR was reviewed with the receiving nurse. Lines:       Opportunity for questions and clarification was provided.       Patient transported with:   [employer]

## 2021-09-01 NOTE — PROGRESS NOTES
TRANSFER - IN REPORT:    Verbal report received from [facility] on [place]  being received from ER  for routine progression of care      Report consisted of patients Situation, Background, Assessment and   Recommendations(SBAR). Information from the following report(s) Kardex was reviewed with the receiving nurse. Opportunity for questions and clarification was provided. Assessment completed upon patients arrival to unit and care assumed.

## 2021-09-01 NOTE — PROGRESS NOTES
Physician Progress Note      Sloan Singh  CSN #:                  [id number]  :                       1960  ADMIT DATE:       2021 10:54 PM  DISCH DATE:  RESPONDING  PROVIDER #:        Evonne Courser MD Blease Dubin MD          QUERY TEXT:    Pt admitted with COVID 19 PNA. Pt noted to have AMS. If possible, please document in the progress notes and discharge summary if you are evaluating and / or treating any of the following: The medical record reflects the following:  Risk Factors: COVID 19/PNA, Lactic Acidosis, Dehydration documented  Clinical Indicators: Lactic Acid 3.1, K+ 3.0, COVID 19 rapid test positive, BP up to 150/99  Treatment: Ceftriaxone IV, Decadron IV, IVF      Thank you. Prince Claros RN CDI, CCS  My office phone [phone]  Options provided:  -- Hypertensive encephalopathy  -- Metabolic encephalopathy  -- Delirium due to, Please specify cause. -- Delirium  -- Other - I will add my own diagnosis  -- Disagree - Not applicable / Not valid  -- Disagree - Clinically unable to determine / Unknown  -- Refer to Clinical Documentation Reviewer    PROVIDER RESPONSE TEXT:    This patient has metabolic encephalopathy.     Query created by: Anil Medina on 2021 3:42 PM      Electronically signed by:  Evonne Courser MD Blease Dubin MD 2021 3:47 PM

## 2021-09-01 NOTE — PROGRESS NOTES
ACUTE PHYSICAL THERAPY GOALS:  (Developed with and agreed upon by patient and/or caregiver. )  LTG:  (1.)Ms. Alarcon will move from supine to sit and sit to supine , scoot up and down and roll side to side in bed with INDEPENDENT within 7 treatment day(s). (2.)Ms. Alarcon will transfer from bed to chair and chair to bed with INDEPENDENT using the least restrictive device within 7 treatment day(s). (3.)Ms. Alarcon will ambulate with SUPERVISION for 150+ feet with the least restrictive device within 7 treatment day(s). ________________________________________________________________________________________________        PHYSICAL THERAPY ASSESSMENT: Initial Assessment and AM PT Treatment Day # 1      Phil Quintanilla is a 62 y.o. female   PRIMARY DIAGNOSIS: <principal problem not specified>  Altered mental status [R41.82]  Pneumonia due to COVID-19 virus [U07.1, J12.82]       Reason for Referral:    ICD-10: Treatment Diagnosis: Generalized Muscle Weakness (M62.81)  Difficulty in walking, Not elsewhere classified (R26.2)  INPATIENT: Payor: /     ASSESSMENT:     REHAB RECOMMENDATIONS:   Recommendation to date pending progress:  Setting:   No further skilled therapy   Equipment:    None     PRIOR LEVEL OF FUNCTION:  (Prior to Hospitalization) INITIAL/CURRENT LEVEL OF FUNCTION:  (Most Recently Demonstrated)   Bed Mobility:   Independent  Sit to Stand:   Independent  Transfers:   Independent  Gait/Mobility:   Independent Bed Mobility:   Standby Assistance  Sit to Stand:  [facility] Assistance  Transfers:   Standby Assistance  Gait/Mobility:   Standby Assistance     ASSESSMENT:  Ms. Patric Hoff was supine at arrival with dtr ([employer] employee) present. Data collection from dtr. Pt non verbal, delayed response, can answer multiple choice questions but not open ended questions, some yes/no questions.  She was able to get to EOB with SUP, stand with initial CGA then SUP, ambualted 20ft with 4min stand, pt reporting fatigue. On RA during session and maintatined above 95%. Pt left sitting EOB. dtr still present. Main limitation is safety from confusion. Pt will benefit from skilled and sophisticated PT to help improve impairments. SUBJECTIVE:   Ms. Joy Pineda states, \"not verbal.\"    SOCIAL HISTORY/LIVING ENVIRONMENT: lives in home with son and DIL, 2 GC. She is IND with all ADLs, no AD used, drives, shops, cares for grandkids.  Very verbal and responsive     OBJECTIVE:     PAIN: VITAL SIGNS: LINES/DRAINS:   Pre Treatment: Pain Screen  Pain Scale 1: Numeric (0 - 10)  Pain Intensity 1: 0  Post Treatment: 0   IV  O2 Device: None (Room air)     GROSS EVALUATION:   Within Functional Limits Abnormal/ Functional Abnormal/ Non-Functional (see comments) Not Tested Comments:   AROM [x] [] [] []    PROM [] [] [] []    Strength [x] [] [] []    Balance [x] [] [] []    Posture [x] [] [] []    Sensation [] [] [] [x]    Coordination [] [] [] [x]    Tone [] [] [] []    Edema [] [] [] []    Activity Tolerance [] [x] [] []     [] [] [] []      COGNITION/  PERCEPTION: Intact Impaired   (see comments) Comments:   Orientation [] [x]    Vision [x] []    Hearing [x] []    Command Following [x] []    Safety Awareness [] [x]     [] []      MOBILITY: I Mod I S SBA CGA Min Mod Max Total  NT x2 Comments:   Bed Mobility    Rolling [] [] [x] [] [] [] [] [] [] [] []    Supine to Sit [] [] [x] [] [] [] [] [] [] [] []    Scooting [] [] [x] [] [] [] [] [] [] [] []    Sit to Supine [] [] [] [] [] [] [] [] [] [] []    Transfers    Sit to Stand [] [] [x] [] [] [] [] [] [] [] []    Bed to Chair [] [] [] [] [] [] [] [] [] [] []    Stand to Sit [] [] [x] [] [] [] [] [] [] [] []    I=Independent, Mod I=Modified Independent, S=Supervision, SBA=Standby Assistance, CGA=Contact Guard Assistance,   Min=Minimal Assistance, Mod=Moderate Assistance, Max=Maximal Assistance, Total=Total Assistance, NT=Not Tested  GAIT: I Mod I S SBA CGA Min Mod Max Total  NT x2 Comments: Level of Assistance [] [] [x] [x] [] [] [] [] [] [] []    Distance 40    DME None    Gait Quality No balance challenges    Weightbearing Status N/A     I=Independent, Mod I=Modified Independent, S=Supervision, SBA=Standby Assistance, CGA=Contact Guard Assistance,   Min=Minimal Assistance, Mod=Moderate Assistance, Max=Maximal Assistance, Total=Total Assistance, NT=Not Tested    [address] [place] [id number] AM-PAC [facility] Form       How much difficulty does the patient currently have. .. Unable A Lot A Little None   1. Turning over in bed (including adjusting bedclothes, sheets and blankets)? [] 1   [] 2   [] 3   [x] 4   2. Sitting down on and standing up from a chair with arms ( e.g., wheelchair, bedside commode, etc.)   [] 1   [] 2   [] 3   [x] 4   3. Moving from lying on back to sitting on the side of the bed? [] 1   [] 2   [] 3   [x] 4   How much help from another person does the patient currently need. .. Total A Lot A Little None   4. Moving to and from a bed to a chair (including a wheelchair)? [] 1   [] 2   [] 3   [x] 4   5. Need to walk in hospital room? [] 1   [] 2   [] 3   [x] 4   6. Climbing 3-5 steps with a railing? [] 1   [] 2   [] 3   [x] 4   © 2007, Trustees of [address] [place] [id number], under license to [employer]. All rights reserved     Score:  Initial: 24 Most Recent: X (Date: -- )    Interpretation of Tool:  Represents activities that are increasingly more difficult (i.e. Bed mobility, Transfers, Gait). PLAN:   FREQUENCY/DURATION: PT Plan of Care: 3 times/week for duration of hospital stay or until stated goals are met, whichever comes first.    PROBLEM LIST:   (Skilled intervention is medically necessary to address:)  1. Decreased ADL/Functional Activities  2. Decreased Activity Tolerance  3. Decreased Balance  4. Decreased Cognition  5. Decreased Gait Ability  6.  Decreased Transfer Abilities   INTERVENTIONS PLANNED:   (Benefits and precautions of physical therapy have been discussed with the patient.)  1. Therapeutic Activity  2. Therapeutic Exercise/HEP  3. Neuromuscular Re-education  4. Gait Training  5. Education     TREATMENT:     EVALUATION: Moderate Complexity : (Untimed Charge)    TREATMENT:   ($$ Therapeutic Activity: 8-22 mins    )  Therapeutic Activity (10 Minutes): Therapeutic activity included Rolling, Supine to Sit, Scooting, Lateral Scooting, Transfer Training, Ambulation on level ground, Sitting balance  and Standing balance to improve functional Mobility, Strength and Activity tolerance.     TREATMENT GRID:  N/A    AFTER TREATMENT POSITION/PRECAUTIONS:  Needs within reach, RN notified, Visitors at bedside and sitting EOB    INTERDISCIPLINARY COLLABORATION:  RN/PCT and PT/PTA    TOTAL TREATMENT DURATION:  PT Patient Time In/Time Out  Time In: 1100  Time Out: 1125    Adam Tilley DPT

## 2021-09-01 NOTE — ASSESSMENT & PLAN NOTE
Not currently hypoxic, short of breath, having any difficulty breathing. Will monitor closely and adjust treatment as necessary.

## 2021-09-01 NOTE — PROGRESS NOTES
ACUTE OT GOALS:  (Developed with and agreed upon by patient and/or caregiver.)  1. Patient will complete lower body bathing and dressing with MOD I and adaptive equipment as needed. 2.Patient will complete upper body bathing and dressing with MOD  I and adaptive equipment as needed. 3. Patient will complete toileting with MOD I.   4. Patient will tolerate 25 minutes of OT treatment with 1-2 rest breaks to increase activity tolerance for ADLs. 5. Patient will complete functional transfers with MOD I and adaptive equipment as needed. 6. Patient will complete functional activity with MOD I and adaptive equipment as needed. Timeframe: 7 visits     OCCUPATIONAL THERAPY ASSESSMENT: Initial Assessment and Daily Note OT Treatment Day # 1    Amina Fong is a 62 y.o. female   PRIMARY DIAGNOSIS: <principal problem not specified>  Altered mental status [R41.82]  Pneumonia due to COVID-19 virus [U07.1, J12.82]       Reason for Referral:    ICD-10: Treatment Diagnosis: Generalized Muscle Weakness (M62.81)  Other lack of cordination (R27.8)  Difficulty in walking, Not elsewhere classified (R26.2)  INPATIENT: Payor: /   ASSESSMENT:     REHAB RECOMMENDATIONS:   Recommendation to date pending progress:  Setting:   No further skilled therapy --home with 24/7 supervision pending cognitive status   Equipment:    To Be Determined     PRIOR LEVEL OF FUNCTION:  (Prior to Hospitalization)  INITIAL/CURRENT LEVEL OF FUNCTION:  (Based on today's evaluation)   Bathing:   Independent  Dressing:   Independent  Feeding/Grooming:   Independent  Toileting:   Independent  Functional Mobility:   Independent Bathing:   Standby Assistance  Dressing:   Standby Assistance  Feeding/Grooming:   Standby Assistance  Toileting:   Standby Assistance  Functional Mobility:   Standby Assistance     ASSESSMENT:  Ms. Dafne Mustafa presented to the hospital with sudden onset of AMS. Found to have COVID pneumonia without hypoxia.   Daughter works as a PCT on the 7th floor and was present at bedside throughout session. Daughter provided all PLOF info and living situation information due to pt being non-verbal throughout session. At baseline, pt is independent for all ADLs and IADLs. Today, pt was received supine in bed. Pt did follow simple commands but remained non-verbal with delayed processing. Able to answer \"yes\" to the correct response when given multiple options. Completed bed mobility with supervision. LB dressing, toileting, grooming task standing at sink, sit>stand, and ambulation with HHA SBA. Required step-by -step directions for completion of ADLs. Pt currently pending MRI. Caroline Conway currently demonstrates overall deficits in strength, balance, activity tolerance, and ADL performance. Patient would benefit from skilled OT services at this time in order to address functional deficits and OT goals stated above. SUBJECTIVE:   Ms. Tom Adams states, \"Yes. \"    SOCIAL HISTORY/LIVING ENVIRONMENT: son and daughter-in-law live with the patient in a mobile home, 6 steps to enter, walk-in shower, independent for all ADLs and IADLs.  drives       OBJECTIVE:     PAIN: VITAL SIGNS: LINES/DRAINS:   Pre Treatment: Pain Screen  Pain Scale 1: Numeric (0 - 10)  Pain Intensity 1: 0  Post Treatment: no change  Vital Signs  O2 Sat (%): 95 %  O2 Device: None (Room air) IV  O2 Device: None (Room air)     GROSS EVALUATION:  BUEs Within Functional Limits Abnormal/ Functional Abnormal/ Non-Functional (see comments) Not Tested Comments:   AROM [x] [] [] []    PROM [] [] [] [x]    Strength [x] [] [] []    Balance [] [x] [] [] Fair+ sitting and standing balance    Posture [x] [] [] []    Sensation [] [] [] [x]    Coordination [] [x] [] []    Tone [x] [] [] []    Edema [x] [] [] []    Activity Tolerance [] [x] [] []     [] [] [] []      COGNITION/  PERCEPTION: Intact Impaired   (see comments) Comments:   Orientation [] [x] Non-verbal, oriented to person and place (multiple choice given to pt)   Vision [] [x] glasses   Hearing [x] []    Judgment/ Insight [] [x] Decreased safety awareness and insight   Attention [] [x] Requires VCs for re-direction to task   Memory [x] []    Command Following [] [x] Simple, one-step, commands   Emotional Regulation [x] []     [] []      ACTIVITIES OF DAILY LIVING: I Mod I S SBA CGA Min Mod Max Total NT Comments   BASIC ADLs:              Bathing/ Showering [] [] [] [] [] [] [] [] [] [x]    Toileting [] [] [] [x] [] [] [] [] [] [] Required VCs for toilet hygiene   Dressing [] [] [] [x] [] [] [] [] [] [] Donned shoes    Feeding [] [] [] [] [] [] [] [] [] [x]    Grooming [] [] [] [x] [] [] [] [] [] [] Washed hands standing at sink   Personal Device Care [] [] [] [] [] [] [] [] [] [x]    Functional Mobility [] [] [] [x] [] [] [] [] [] [] HHA   I=Independent, Mod I=Modified Independent, S=Supervision, SBA=Standby Assistance, CGA=Contact Guard Assistance,   Min=Minimal Assistance, Mod=Moderate Assistance, Max=Maximal Assistance, Total=Total Assistance, NT=Not Tested    MOBILITY: I Mod I S SBA CGA Min Mod Max Total  NT x2 Comments:   Supine to sit [] [] [x] [] [] [] [] [] [] [] []    Sit to supine [] [] [x] [] [] [] [] [] [] [] []    Sit to stand [] [] [] [x] [] [] [] [] [] [] []    Bed to chair [] [] [] [] [] [] [] [] [] [] [] SBA for ambulation in room with HHA   I=Independent, Mod I=Modified Independent, S=Supervision, SBA=Standby Assistance, CGA=Contact Guard Assistance,   Min=Minimal Assistance, Mod=Moderate Assistance, Max=Maximal Assistance, Total=Total Assistance, NT=Not Tested    [facility] [address] [address] AM-PAC 6 Clicks   Daily Activity Inpatient Short Form        How much help from another person does the patient currently need. .. Total A Lot A Little None   1. Putting on and taking off regular lower body clothing? [] 1   [] 2   [x] 3   [] 4   2. Bathing (including washing, rinsing, drying)? [] 1   [] 2   [x] 3   [] 4   3.   Toileting, which includes using toilet, bedpan or urinal?   [] 1   [] 2   [x] 3   [] 4   4. Putting on and taking off regular upper body clothing? [] 1   [] 2   [x] 3   [] 4   5. Taking care of personal grooming such as brushing teeth? [] 1   [] 2   [x] 3   [] 4   6. Eating meals? [] 1   [] 2   [x] 3   [] 4   © 2007, Trustees of [address] [place] [address] [address] [address], under license to [email or web address]. All rights reserved     Score:  Initial: 18 completed on 9/1/21 Most Recent: X (Date: -- )   Interpretation of Tool:  Represents activities that are increasingly more difficult (i.e. Bed mobility, Transfers, Gait). PLAN:   FREQUENCY/DURATION: OT Plan of Care: 3 times/week for duration of hospital stay or until stated goals are met, whichever comes first.    PROBLEM LIST:   (Skilled intervention is medically necessary to address:)  1. Decreased ADL/Functional Activities  2. Decreased Activity Tolerance  3. Decreased Balance  4. Decreased Cognition  5. Decreased Transfer Abilities   INTERVENTIONS PLANNED:   (Benefits and precautions of occupational therapy have been discussed with the patient.)  1. Self Care Training  2. Therapeutic Activity  3. Therapeutic Exercise/HEP  4. Neuromuscular Re-education  5. Education     TREATMENT:     EVALUATION: Low Complexity : (Untimed Charge)    TREATMENT:   ($$ Self Care/Home Management: 8-22 mins    )  Self Care (10 Minutes): Self care including Toileting, Lower Body Dressing and Grooming to increase independence and decrease level of assistance required.     TREATMENT GRID:  N/A    AFTER TREATMENT POSITION/PRECAUTIONS:  Bed, Needs within reach, RN notified and Visitors at bedside    INTERDISCIPLINARY COLLABORATION:  RN/PCT and OT/MERCER    TOTAL TREATMENT DURATION:  OT Patient Time In/Time Out  Time In: 1023  Time Out: 777 [facility]

## 2021-09-01 NOTE — PROGRESS NOTES
CM contacted the patient's daughter Kala Lund [phone] to obtain assessment information due to COVID isolation. Demographic information verified by daughter. The patient lives in a mobile home with her son Stan Cote with 7 steps at the entrance. Son provides financial support for the patient. Daughter confirmed at baseline, the patient is independent with completing ADL's and drives. The patient does not have healthcare insurance. Patient follows up with [facility] and [facility] in [facility]. Family is supportive with care needs. DME: NONE     Patient receives her prescription medications from [place] [address] and [facility]. No concerns voiced with obtaining medications in the community. Discharge planning; PT/OT has been consulted. Daughter denies any history of  services and REHAB. Daughter anticipates the patient to return home with family assistance when medically stable. No needs or concerns voiced at this time. Please consult or notify CM if any needs shall arise. CM continues to follow plan of care. Care Management Interventions  PCP Verified by CM: Yes ([facility] in [facility]. )  Mode of Transport at Discharge: Other (see comment) (Family. )  Transition of Care Consult (CM Consult): Discharge Planning  Discharge Durable Medical Equipment: No  Physical Therapy Consult: No  Occupational Therapy Consult: No  Speech Therapy Consult: No  Current Support Network: Relative's Home  Confirm Follow Up Transport: Family (Patient is able to drive. )  The Plan for Transition of Care is Related to the Following Treatment Goals : Return to Prior Level of Functioning.    The Patient and/or Patient Representative was Provided with a Choice of Provider and Agrees with the Discharge Plan?: Yes   Resource Information Provided?: No  Discharge Location  Discharge Placement: Home with family assistance

## 2021-09-01 NOTE — ACP (ADVANCE CARE PLANNING)
CM was informed by primary nurse Yulisa Parry to complete ACP discussion with NOK. Per chart review, no HCPOA or advance directive on file. Per daughter Lucille Gill [phone], the patient's spouse is . Patient has 4 children, who are [facility] [phone], Johnny March [phone], Lucille Gill [phone], and Paulina Sheets who requests not be listed as a contact, per daughter Margaret Barrientos. ACP discussion completed with daughter this day, as the patient presents confused at this time, per staff. Advance Care Planning     Advance Care Planning Activator (Inpatient)  Conversation Note      Date of ACP Conversation: 21     Conversation Conducted with:   Patient with Decision Making Capacity and Healthcare Decision Maker: Next of Kin by law (only applies in absence of a Healthcare Power of  or Legal Guardian)    ACP Activator:  [address] Maker:    Current Designated Health Care Decision Maker:     Primary Decision Maker: Ratna Grady - Daughter - [phone]    Primary Decision Maker: Guillermo Fajardo - Daughter - [phone]    Primary Decision Maker: Nisreen Meier - Daughter - [phone]  Click here to complete [address] including selection of the Healthcare Decision Maker Relationship (ie \"Primary\")  Today we documented Decision Maker(s) consistent with Legal Next of Kin hierarchy. Care Preferences    Ventilation: \"If you were in your present state of health and suddenly became very ill and were unable to breathe on your own, what would your preference be about the use of a ventilator (breathing machine) if it were available to you? \"      If patient would desire the use of a ventilator (breathing machine), answer \"yes\", if not \"no\":yes    \"If your health worsens and it becomes clear that your chance of recovery is unlikely, what would your preference be about the use of a ventilator (breathing machine) if it were available to you? \"     Would the patient desire the use of a ventilator (breathing machine)? YES      Resuscitation  \"CPR works best to restart the heart when there is a sudden event, like a heart attack, in someone who is otherwise healthy. Unfortunately, CPR does not typically restart the heart for people who have serious health conditions or who are very sick. \"    \"In the event your heart stopped as a result of an underlying serious health condition, would you want attempts to be made to restart your heart (answer \"yes\" for attempt to resuscitate) or would you prefer a natural death (answer \"no\" for do not attempt to resuscitate)? \" yes      [x] Yes  [] No   Educated Patient / Anna Alatorre regarding differences between Advance Directives and portable DNR orders.     Length of ACP Conversation in minutes:  15    Conversation Outcomes:  [x] ACP discussion completed  [] Existing advance directive reviewed with patient; no changes to patient's previously recorded wishes     [] New Advance Directive completed   [] Portable Do Not Resuscitate prepared for Provider review and signature  [] POLST/POST/MOLST/MOST prepared for Provider review and signature      Follow-up plan:    [] Schedule follow-up conversation to continue planning  [] Referred individual to Provider for additional questions/concerns   [] Advised patient/agent/surrogate to review completed ACP document and update if needed with changes in condition, patient preferences or care setting     [x] This note routed to one or more involved healthcare providers

## 2021-09-01 NOTE — PROGRESS NOTES
[facility]ist Progress Note     Name:  Grover Vásquez  Age:61 y.o. Sex:female   :  1960    MRN:  [id number]     Admit Date:  2021    Reason for Admission:  Altered mental status [R41.82]  Pneumonia due to COVID-19 virus [U07.1, J12.82]    Assessment & Plan   Acute metabolic encephalopathy-  Pt is slow to respond to verbal commands but able to follow commands  Can say 1 or 2 words and at one point did say doing ok  Does says ok for most of the questions  Will order asa 81 mg and also order MRI brain    Hypokalemia  Replaced with kcl 40 meq q 4 x2. Lactic acidosis? Etiology   Resolved    Covid positive  No on oxygen   cxr infiltrate, on rocephin    htn  Losartan  Held hctz    Anxiety/depression  On paxil    Hypothyroid  Cont synthroid 88 mcq              Diet:  DIET ADULT  DVT PPx: lovenox  Code: Full Code      Hospital Course/Subjective:   Grover Vásquez is a 62 y.o. female with medical history of obesity and hypertension who presented with report of 5 days of intermittent cough, nausea, diarrhea, loss of sense of taste and smell, with altered mental status that started yesterday and has worsened over the course the last 24 hours. She denies fever, chills, current body aches, vomiting, abdominal pain, chest pain, constipation, focal weakness or numbness. Admitted for confusion and elevated lactic acid      Subjective/24 hr Events (21):  Daughter at bedside  Pt awake, can say yes no and doing ok. For most of the question , answers as ok. According to daughter she normal talks fluently ,takes care of her self . With pt not talking much and confused got worse in past 1 day according to daughter    Review of Systems: 14 point review of systems is otherwise negative with the exception of the elements mentioned above.     Objective:     Patient Vitals for the past 24 hrs:   Temp Pulse Resp BP SpO2   21 1101 98.4 °F (36.9 °C) 87 20 (!) 153/76 96 %   21 1023     95 % 09/01/21 0813 98.7 °F (37.1 °C) 87 20 (!) 150/84 95 %   09/01/21 0605 98.3 °F (36.8 °C) 91 20 (!) 155/90 96 %   09/01/21 0415  90 18 (!) 140/89 97 %   08/31/21 1702 97.8 °F (36.6 °C) 94 18 (!) 141/92 97 %     Oxygen Therapy  O2 Sat (%): 96 % (09/01/21 1101)  O2 Device: None (Room air) (09/01/21 1023)    Body mass index is 34.97 kg/m². Physical Exam:   General:      awake, no acute distress. Well nourished . Not oriented in time place  HENT:   normocephalic, atraumatic. Oropharynx clear with moist mucous membranes and normal hard/soft palate  Eyes:   anicteric sclerae, moist conjunctiva, PERRL, EOMI  Neck:    supple, non-tender. Trachea midline. Lungs:   CTAB, no wheezing, rhonchi, rales  Cardiac:   RRR, Normal S1 and S2. No S3, S4 or murmurs. Abdomen:   Soft, non distended, nontender, +BS, no guarding/rebound  Extremities:   Warm, dry. No edema , pedal pulses present, no digital cyanosis  Skin:   Warn, dry, normnal turgor and texture; no rash, ulcers or nodules appreciated  Neuro:   Can move all extremities. Can follow commands and need at times repetition .  Not talking much  Psychiatric:  No anxiety, calm, cooperative      Data Review:  I have reviewed all labs, meds, and studies from the last 24 hours:    Labs:  Recent Results (from the past 24 hour(s))   GLUCOSE, POC    Collection Time: 08/31/21  5:01 PM   Result Value Ref Range    Glucose (POC) 124 (H) 65 - 100 mg/dL    Performed by Valentín    CBC WITH AUTOMATED DIFF    Collection Time: 08/31/21  5:08 PM   Result Value Ref Range    WBC 5.3 4.3 - 11.1 K/uL    RBC 4.57 4.05 - 5.2 M/uL    HGB 14.0 11.7 - 15.4 g/dL    HCT 40.8 35.8 - 46.3 %    MCV 89.3 79.6 - 97.8 FL    MCH 30.6 26.1 - 32.9 PG    MCHC 34.3 31.4 - 35.0 g/dL    RDW 12.7 11.9 - 14.6 %    PLATELET 030 285 - 257 K/uL    MPV 10.3 9.4 - 12.3 FL    ABSOLUTE NRBC 0.00 0.0 - 0.2 K/uL    DF AUTOMATED      NEUTROPHILS 68 43 - 78 %    LYMPHOCYTES 20 13 - 44 %    MONOCYTES 7 4.0 - 12.0 % EOSINOPHILS 3 0.5 - 7.8 %    BASOPHILS 0 0.0 - 2.0 %    IMMATURE GRANULOCYTES 2 0.0 - 5.0 %    ABS. NEUTROPHILS 3.6 1.7 - 8.2 K/UL    ABS. LYMPHOCYTES 1.1 0.5 - 4.6 K/UL    ABS. MONOCYTES 0.4 0.1 - 1.3 K/UL    ABS. EOSINOPHILS 0.2 0.0 - 0.8 K/UL    ABS. BASOPHILS 0.0 0.0 - 0.2 K/UL    ABS. IMM. GRANS. 0.1 0.0 - 0.5 K/UL   METABOLIC PANEL, COMPREHENSIVE    Collection Time: 08/31/21  5:08 PM   Result Value Ref Range    Sodium 138 136 - 145 mmol/L    Potassium 3.0 (L) 3.5 - 5.1 mmol/L    Chloride 107 98 - 107 mmol/L    CO2 23 21 - 32 mmol/L    Anion gap 8 7 - 16 mmol/L    Glucose 120 (H) 65 - 100 mg/dL    BUN 11 8 - 23 MG/DL    Creatinine 0.80 0.6 - 1.0 MG/DL    GFR est AA >60 >60 ml/min/1.73m2    GFR est non-AA >60 >60 ml/min/1.73m2    Calcium 8.7 8.3 - 10.4 MG/DL    Bilirubin, total 0.6 0.2 - 1.1 MG/DL    ALT (SGPT) 83 (H) 12 - 65 U/L    AST (SGOT) 52 (H) 15 - 37 U/L    Alk.  phosphatase 55 50 - 136 U/L    Protein, total 7.5 6.3 - 8.2 g/dL    Albumin 3.1 (L) 3.2 - 4.6 g/dL    Globulin 4.4 (H) 2.3 - 3.5 g/dL    A-G Ratio 0.7 (L) 1.2 - 3.5     TSH 3RD GENERATION    Collection Time: 08/31/21  5:08 PM   Result Value Ref Range    TSH 2.250 0.358 - 3.740 uIU/mL   LACTIC ACID    Collection Time: 08/31/21  5:08 PM   Result Value Ref Range    Lactic acid 3.1 (H) 0.4 - 2.0 MMOL/L   SARS-COV-2    Collection Time: 08/31/21  5:08 PM   Result Value Ref Range    SARS-CoV-2 Please find results under separate order     COVID-19 RAPID TEST    Collection Time: 08/31/21  5:08 PM   Result Value Ref Range    Specimen source Nasopharyngeal      COVID-19 rapid test Detected (AA) NOTD     PROCALCITONIN    Collection Time: 08/31/21  5:08 PM   Result Value Ref Range    Procalcitonin <0.05 ng/mL   EKG, 12 LEAD, INITIAL    Collection Time: 08/31/21  5:10 PM   Result Value Ref Range    Ventricular Rate 94 BPM    Atrial Rate 94 BPM    P-R Interval 152 ms    QRS Duration 96 ms    Q-T Interval 356 ms    QTC Calculation (Bezet) 445 ms    Calculated P Axis 38 degrees    Calculated R Axis 68 degrees    Calculated T Axis 10 degrees    Diagnosis       Normal sinus rhythm  Nonspecific T wave abnormality  Abnormal ECG  No previous ECGs available  Confirmed by Abdelrahman Headley MD (), [place] ([id number]) on 9/1/2021 9:23:31 AM     LACTIC ACID    Collection Time: 08/31/21 11:40 PM   Result Value Ref Range    Lactic acid 1.3 0.4 - 2.0 MMOL/L   CULTURE, BLOOD    Collection Time: 08/31/21 11:40 PM    Specimen: Blood   Result Value Ref Range    Special Requests: LEFT  HAND        Culture result: NO GROWTH AFTER 8 HOURS     CULTURE, URINE    Collection Time: 09/01/21 12:10 AM    Specimen: Clean catch    URINE   Result Value Ref Range    Special Requests: NO SPECIAL REQUESTS      Culture result:        NO GROWTH AFTER SHORT PERIOD OF INCUBATION. FURTHER RESULTS TO FOLLOW AFTER OVERNIGHT INCUBATION. All Micro Results     Procedure Component Value Units Date/Time    CULTURE, BLOOD [[id number]] Collected: 08/31/21 2340    Order Status: Completed Specimen: Blood Updated: 09/01/21 0844     Special Requests: --        LEFT  HAND       Culture result: NO GROWTH AFTER 8 HOURS       CULTURE, URINE [[id number]] Collected: 09/01/21 0010    Order Status: Completed Specimen: Clean catch Updated: 09/01/21 0710     Special Requests: NO SPECIAL REQUESTS        Culture result:       NO GROWTH AFTER SHORT PERIOD OF INCUBATION. FURTHER RESULTS TO FOLLOW AFTER OVERNIGHT INCUBATION. CULTURE, BLOOD [[id number]] Collected: 08/31/21 2315    Order Status: Canceled Specimen: Blood     COVID-19 RAPID TEST [[id number]]  (Abnormal) Collected: 08/31/21 1708    Order Status: Completed Specimen: Nasopharyngeal Updated: 08/31/21 1735     Specimen source Nasopharyngeal        COVID-19 rapid test Detected        Comment:      The specimen is POSITIVE for SARS-CoV-2, the novel coronavirus associated with COVID-19.        This test has been authorized by the FDA under an Emergency Use Authorization (EUA) for use by authorized laboratories. Fact sheet for Healthcare Providers: ConventionUpdate.co.nz  Fact sheet for Patients: ConventionUpdate.co.nz       Methodology: Isothermal Nucleic Acid Amplification         COVID-19 RAPID TEST [[id number]] Collected: 08/31/21 1715    Order Status: Canceled           EKG Results     Procedure 720 Value Units Date/Time    EKG, 12 LEAD, INITIAL [[id number]] Collected: 08/31/21 1710    Order Status: Completed Updated: 09/01/21 0923     Ventricular Rate 94 BPM      Atrial Rate 94 BPM      P-R Interval 152 ms      QRS Duration 96 ms      Q-T Interval 356 ms      QTC Calculation (Bezet) 445 ms      Calculated P Axis 38 degrees      Calculated R Axis 68 degrees      Calculated T Axis 10 degrees      Diagnosis --     Normal sinus rhythm  Nonspecific T wave abnormality  Abnormal ECG  No previous ECGs available  Confirmed by Jennifer Keller MD (), [place] ([id number]) on 9/1/2021 9:23:31 AM            Other Studies:  CT HEAD WO CONT    Result Date: 8/31/2021  HEAD CT WITHOUT CONTRAST  8/31/2021 HISTORY: Confusion and sluggish response yesterday at noon. TECHNIQUE: Noncontrast axial images were obtained through the brain. All CT scans at this facility used dose modulation, interactive reconstruction and/or weight based dosing when appropriate to reduce radiation dose to as low as reasonably achievable. COMPARISON: None FINDINGS: There is no acute intracranial hemorrhage, significant mass effect or CT evidence of acute large artery territorial infarction. Please note that a hyperacute infarct or small vessel infarct may not be apparent on initial CT imaging. Mild cerebral volume loss is present. There is no hydrocephalus , intra-axial mass or abnormal extra-axial fluid collection. There are no displaced skull fractures. The mastoid air cells and paranasal sinuses are clear where imaged.      No acute findings     XR CHEST PORT    Result Date: 8/31/2021  PORTABLE CHEST X-RAY 8/31/2021 6:03 PM HISTORY: AMS/confusion COMPARISON: None available FINDINGS: The lung volumes are diminished. Patchy peripheral bilateral infiltrates are present. Pleural spaces are clear. Low lung volumes with bilateral infiltrates.       Current Meds:   Current Facility-Administered Medications   Medication Dose Route Frequency    estradioL (ESTRACE) tablet 1 mg  1 mg Oral DAILY    [Held by provider] hydroCHLOROthiazide (HYDRODIURIL) tablet 25 mg  25 mg Oral DAILY    losartan (COZAAR) tablet 50 mg  50 mg Oral DAILY    metoclopramide HCl (REGLAN) tablet 10 mg  10 mg Oral AC&HS    pantoprazole (PROTONIX) tablet 40 mg  40 mg Oral ACB    PARoxetine (PAXIL) tablet 20 mg  20 mg Oral DAILY    levothyroxine (SYNTHROID) tablet 88 mcg  88 mcg Oral ACB    traZODone (DESYREL) tablet 50 mg  50 mg Oral QHS PRN    sodium chloride (NS) flush 5-40 mL  5-40 mL IntraVENous Q8H    sodium chloride (NS) flush 5-40 mL  5-40 mL IntraVENous PRN    acetaminophen (TYLENOL) tablet 650 mg  650 mg Oral Q6H PRN    Or    acetaminophen (TYLENOL) suppository 650 mg  650 mg Rectal Q6H PRN    polyethylene glycol (MIRALAX) packet 17 g  17 g Oral DAILY PRN    ondansetron (ZOFRAN ODT) tablet 4 mg  4 mg Oral Q8H PRN    Or    ondansetron (ZOFRAN) injection 4 mg  4 mg IntraVENous Q6H PRN    enoxaparin (LOVENOX) injection 30 mg  30 mg SubCUTAneous Q12H    0.9% sodium chloride infusion  75 mL/hr IntraVENous CONTINUOUS    cholecalciferol (VITAMIN D3) (1000 Units /25 mcg) tablet 2,000 Units  2,000 Units Oral DAILY    cefTRIAXone (ROCEPHIN) 1 g in 0.9% sodium chloride (MBP/ADV) 50 mL MBP  1 g IntraVENous Q24H    hydrALAZINE (APRESOLINE) 20 mg/mL injection 10 mg  10 mg IntraVENous Q6H PRN    aspirin chewable tablet 81 mg  81 mg Oral DAILY       Problem List:  Hospital Problems as of 9/1/2021 Date Reviewed: 9/1/2021        Codes Class Noted - Resolved POA    Essential hypertension ICD-10-CM: I10  ICD-9-CM: 401.9  9/1/2021 - Present Unknown        Pneumonia due to COVID-19 virus ICD-10-CM: U07.1, J12.82  ICD-9-CM: 480.8, 079.89  9/1/2021 - Present Yes        Altered mental status ICD-10-CM: R41.82  ICD-9-CM: 780.97  9/1/2021 - Present Yes        Lactic acidosis ICD-10-CM: E87.2  ICD-9-CM: 276.2  9/1/2021 - Present Yes        Acute metabolic encephalopathy [id number]-[id number]: G93.41  ICD-9-CM: 348.31  9/1/2021 - Present Unknown        Hypokalemia ICD-10-CM: E87.6  ICD-9-CM: 276.8  9/1/2021 - Present Unknown                 Signed By: Lilia Lang MD   [facility] Hospitalist Service    September 1, 2021  3:54 PM

## 2021-09-01 NOTE — ASSESSMENT & PLAN NOTE
No obvious source of infection. Rocephin given. Urine culture pending. Will monitor closely and adjust treatment as necessary. Of note, point-of-care urine was done in the emergency department and it was negative with exception of mild ketones.

## 2021-09-02 ENCOUNTER — APPOINTMENT (OUTPATIENT)
Dept: MRI IMAGING | Age: 61
DRG: 177 | End: 2021-09-02
Attending: FAMILY MEDICINE
Payer: OTHER GOVERNMENT

## 2021-09-02 VITALS
RESPIRATION RATE: 18 BRPM | HEIGHT: 68 IN | DIASTOLIC BLOOD PRESSURE: 82 MMHG | OXYGEN SATURATION: 96 % | WEIGHT: 230 LBS | HEART RATE: 86 BPM | BODY MASS INDEX: 34.86 KG/M2 | TEMPERATURE: 98.4 F | SYSTOLIC BLOOD PRESSURE: 129 MMHG

## 2021-09-02 LAB
ALBUMIN SERPL-MCNC: 2.7 G/DL (ref 3.2–4.6)
ALBUMIN/GLOB SERPL: 0.7 {RATIO} (ref 1.2–3.5)
ALP SERPL-CCNC: 47 U/L (ref 50–136)
ALT SERPL-CCNC: 71 U/L (ref 12–65)
ANION GAP SERPL CALC-SCNC: 7 MMOL/L (ref 7–16)
AST SERPL-CCNC: 46 U/L (ref 15–37)
BASOPHILS # BLD: 0 K/UL (ref 0–0.2)
BASOPHILS NFR BLD: 0 % (ref 0–2)
BILIRUB SERPL-MCNC: 0.3 MG/DL (ref 0.2–1.1)
BUN SERPL-MCNC: 6 MG/DL (ref 8–23)
CALCIUM SERPL-MCNC: 8.1 MG/DL (ref 8.3–10.4)
CHLORIDE SERPL-SCNC: 115 MMOL/L (ref 98–107)
CO2 SERPL-SCNC: 22 MMOL/L (ref 21–32)
CREAT SERPL-MCNC: 0.63 MG/DL (ref 0.6–1)
DIFFERENTIAL METHOD BLD: ABNORMAL
EOSINOPHIL # BLD: 0.1 K/UL (ref 0–0.8)
EOSINOPHIL NFR BLD: 2 % (ref 0.5–7.8)
ERYTHROCYTE [DISTWIDTH] IN BLOOD BY AUTOMATED COUNT: 13 % (ref 11.9–14.6)
GLOBULIN SER CALC-MCNC: 4 G/DL (ref 2.3–3.5)
GLUCOSE SERPL-MCNC: 127 MG/DL (ref 65–100)
HCT VFR BLD AUTO: 37.2 % (ref 35.8–46.3)
HGB BLD-MCNC: 12.4 G/DL (ref 11.7–15.4)
IMM GRANULOCYTES # BLD AUTO: 0.1 K/UL (ref 0–0.5)
IMM GRANULOCYTES NFR BLD AUTO: 2 % (ref 0–5)
LYMPHOCYTES # BLD: 0.9 K/UL (ref 0.5–4.6)
LYMPHOCYTES NFR BLD: 24 % (ref 13–44)
MAGNESIUM SERPL-MCNC: 2.1 MG/DL (ref 1.8–2.4)
MCH RBC QN AUTO: 31.2 PG (ref 26.1–32.9)
MCHC RBC AUTO-ENTMCNC: 33.3 G/DL (ref 31.4–35)
MCV RBC AUTO: 93.5 FL (ref 79.6–97.8)
MONOCYTES # BLD: 0.3 K/UL (ref 0.1–1.3)
MONOCYTES NFR BLD: 9 % (ref 4–12)
NEUTS SEG # BLD: 2.2 K/UL (ref 1.7–8.2)
NEUTS SEG NFR BLD: 62 % (ref 43–78)
NRBC # BLD: 0 K/UL (ref 0–0.2)
PLATELET # BLD AUTO: 273 K/UL (ref 150–450)
PMV BLD AUTO: 10.5 FL (ref 9.4–12.3)
POTASSIUM SERPL-SCNC: 3.2 MMOL/L (ref 3.5–5.1)
PROT SERPL-MCNC: 6.7 G/DL (ref 6.3–8.2)
RBC # BLD AUTO: 3.98 M/UL (ref 4.05–5.2)
SODIUM SERPL-SCNC: 144 MMOL/L (ref 136–145)
WBC # BLD AUTO: 3.6 K/UL (ref 4.3–11.1)

## 2021-09-02 PROCEDURE — 80053 COMPREHEN METABOLIC PANEL: CPT

## 2021-09-02 PROCEDURE — 70551 MRI BRAIN STEM W/O DYE: CPT

## 2021-09-02 PROCEDURE — 83735 ASSAY OF MAGNESIUM: CPT

## 2021-09-02 PROCEDURE — 36415 COLL VENOUS BLD VENIPUNCTURE: CPT

## 2021-09-02 PROCEDURE — 85025 COMPLETE CBC W/AUTO DIFF WBC: CPT

## 2021-09-02 PROCEDURE — 74011250636 HC RX REV CODE- 250/636: Performed by: EMERGENCY MEDICINE

## 2021-09-02 PROCEDURE — 74011250637 HC RX REV CODE- 250/637: Performed by: EMERGENCY MEDICINE

## 2021-09-02 PROCEDURE — 97535 SELF CARE MNGMENT TRAINING: CPT

## 2021-09-02 PROCEDURE — 74011250637 HC RX REV CODE- 250/637: Performed by: FAMILY MEDICINE

## 2021-09-02 RX ORDER — GUAIFENESIN 100 MG/5ML
81 LIQUID (ML) ORAL DAILY
Qty: 30 TABLET | Refills: 0 | Status: SHIPPED | OUTPATIENT
Start: 2021-09-03

## 2021-09-02 RX ORDER — POTASSIUM CHLORIDE 20 MEQ/1
40 TABLET, EXTENDED RELEASE ORAL EVERY 4 HOURS
Status: COMPLETED | OUTPATIENT
Start: 2021-09-02 | End: 2021-09-02

## 2021-09-02 RX ORDER — POTASSIUM CHLORIDE 20 MEQ/1
20 TABLET, EXTENDED RELEASE ORAL DAILY
Qty: 4 TABLET | Refills: 0 | Status: SHIPPED | OUTPATIENT
Start: 2021-09-02

## 2021-09-02 RX ORDER — CEFPODOXIME PROXETIL 200 MG/1
200 TABLET, FILM COATED ORAL 2 TIMES DAILY
Qty: 10 TABLET | Refills: 0 | Status: SHIPPED | OUTPATIENT
Start: 2021-09-02

## 2021-09-02 RX ORDER — LOSARTAN POTASSIUM 100 MG/1
100 TABLET ORAL DAILY
Qty: 30 TABLET | Refills: 0 | Status: SHIPPED | OUTPATIENT
Start: 2021-09-02

## 2021-09-02 RX ADMIN — POTASSIUM CHLORIDE 40 MEQ: 20 TABLET, EXTENDED RELEASE ORAL at 08:36

## 2021-09-02 RX ADMIN — METOCLOPRAMIDE 10 MG: 10 TABLET ORAL at 11:19

## 2021-09-02 RX ADMIN — Medication 400 MG: at 08:24

## 2021-09-02 RX ADMIN — ENOXAPARIN SODIUM 30 MG: 30 INJECTION SUBCUTANEOUS at 08:23

## 2021-09-02 RX ADMIN — LEVOTHYROXINE SODIUM 88 MCG: 0.09 TABLET ORAL at 06:03

## 2021-09-02 RX ADMIN — PAROXETINE HYDROCHLORIDE 20 MG: 20 TABLET, FILM COATED ORAL at 08:24

## 2021-09-02 RX ADMIN — Medication 10 ML: at 05:11

## 2021-09-02 RX ADMIN — POTASSIUM CHLORIDE 40 MEQ: 20 TABLET, EXTENDED RELEASE ORAL at 11:19

## 2021-09-02 RX ADMIN — ASPIRIN 81 MG: 81 TABLET, CHEWABLE ORAL at 08:24

## 2021-09-02 RX ADMIN — ESTRADIOL 1 MG: 1 TABLET ORAL at 08:24

## 2021-09-02 RX ADMIN — VITAMIN D, TAB 1000IU (100/BT) 2000 UNITS: 25 TAB at 08:24

## 2021-09-02 RX ADMIN — PANTOPRAZOLE SODIUM 40 MG: 40 TABLET, DELAYED RELEASE ORAL at 06:02

## 2021-09-02 RX ADMIN — Medication 10 ML: at 13:06

## 2021-09-02 RX ADMIN — SODIUM CHLORIDE 75 ML/HR: 900 INJECTION, SOLUTION INTRAVENOUS at 11:48

## 2021-09-02 RX ADMIN — METOCLOPRAMIDE 10 MG: 10 TABLET ORAL at 06:02

## 2021-09-02 RX ADMIN — LOSARTAN POTASSIUM 50 MG: 50 TABLET, FILM COATED ORAL at 08:24

## 2021-09-02 NOTE — DISCHARGE INSTRUCTIONS
Follow up with pcp in a week with bmp. Hold blood pressure medication if systolic less then 739 mmhg. Come back to er if oxygen saturation less then 90% on room air or any new confusion. Avoid celebrex if possible. If have take it , take it with food. Advised to take aspirin 81 mg po q daily. Risk of gastritis with asa and celebrex. Continue omeprazole.

## 2021-09-02 NOTE — DISCHARGE SUMMARY
Hospitalist Discharge Summary     Admit Date:  2021 10:54 PM   Name:  Maliha Galvez   Age:  62 y.o.  :  1960   MRN:  [id number]   PCP:  None  Treatment Team: Hospitalist: Harshad Gold MD; Care Manager: Justice Pimentel; Charge Nurse: Harry Santoro, RN; Occupational Therapist: Von Cagle OT; Utilization Review: [facility] Primary Nurse: Mike Carlson RN    Problem List for this Hospitalization:  Hospital Problems as of 2021 Date Reviewed: 2021        Codes Class Noted - Resolved POA    Essential hypertension ICD-10-CM: I10  ICD-9-CM: 401.9  2021 - Present Unknown        Pneumonia due to COVID-19 virus ICD-10-CM: U07.1, J12.82  ICD-9-CM: 480.8, 079.89  2021 - Present Yes        Altered mental status ICD-10-CM: R41.82  ICD-9-CM: 780.97  2021 - Present Yes        Lactic acidosis ICD-10-CM: E87.2  ICD-9-CM: 276.2  2021 - Present Yes        Acute metabolic encephalopathy DEG-33-ZV: G93.41  ICD-9-CM: 348.31  2021 - Present Unknown        Hypokalemia ICD-10-CM: E87.6  ICD-9-CM: 276.8  2021 - Present Unknown                Admission HPI from 2021:    Maliha Galvez is a 62 y.o. female with medical history of obesity and hypertension who presented with report of 5 days of intermittent cough, nausea, diarrhea, loss of sense of taste and smell, with altered mental status that started yesterday and has worsened over the course the last 24 hours. She denies fever, chills, current body aches, vomiting, abdominal pain, chest pain, constipation, focal weakness or numbness.       Hospital Course:  Admitted for metabolic encephalopathy,covid and lactic acidosis  pts metabolic encephalopathy gradually got better. MRI ted no acute findings. Pt didn't require covid directed treatment as per pt was not hypoxic. Urine culture mixed skin  erin, continued on rocephin, discharged on vantin.   Adjusted blood pressure medications  Replaced potassium  OT recommended home health  Pt stable for discharge    Follow up instructions below. Plan was discussed with pt and daughter. All questions answered. Patient was stable at time of discharge and was instructed to call or return if there are any concerns or recurrence of symptoms. Diagnostic Imaging/Tests:   CT HEAD WO CONT    Result Date: 8/31/2021  HEAD CT WITHOUT CONTRAST  8/31/2021 HISTORY: Confusion and sluggish response yesterday at noon. TECHNIQUE: Noncontrast axial images were obtained through the brain. All CT scans at this facility used dose modulation, interactive reconstruction and/or weight based dosing when appropriate to reduce radiation dose to as low as reasonably achievable. COMPARISON: None FINDINGS: There is no acute intracranial hemorrhage, significant mass effect or CT evidence of acute large artery territorial infarction. Please note that a hyperacute infarct or small vessel infarct may not be apparent on initial CT imaging. Mild cerebral volume loss is present. There is no hydrocephalus , intra-axial mass or abnormal extra-axial fluid collection. There are no displaced skull fractures. The mastoid air cells and paranasal sinuses are clear where imaged. No acute findings     XR CHEST PORT    Result Date: 8/31/2021  PORTABLE CHEST X-RAY 8/31/2021 6:03 PM HISTORY: AMS/confusion COMPARISON: None available FINDINGS: The lung volumes are diminished. Patchy peripheral bilateral infiltrates are present. Pleural spaces are clear. Low lung volumes with bilateral infiltrates. Echocardiogram results:  No results found for this visit on 08/31/21.       All Micro Results     Procedure Component Value Units Date/Time    CULTURE, BLOOD [[id number]] Collected: 08/31/21 2340    Order Status: Completed Specimen: Blood Updated: 09/02/21 6316     Special Requests: --        LEFT  HAND       Culture result: NO GROWTH 2 DAYS       CULTURE, URINE [[id number]] Collected: 09/01/21 0010    Order Status: Completed Specimen: Clean catch Updated: 09/02/21 0641     Special Requests: NO SPECIAL REQUESTS        Culture result:       50,000-100,000 COLONIES/mL MIXED SKIN DEVI ISOLATED          CULTURE, BLOOD [[id number]] Collected: 08/31/21 2315    Order Status: Canceled Specimen: Blood     COVID-19 RAPID TEST [[id number]]  (Abnormal) Collected: 08/31/21 1708    Order Status: Completed Specimen: Nasopharyngeal Updated: 08/31/21 1735     Specimen source Nasopharyngeal        COVID-19 rapid test Detected        Comment:      The specimen is POSITIVE for SARS-CoV-2, the novel coronavirus associated with COVID-19. This test has been authorized by the FDA under an Emergency Use Authorization (EUA) for use by authorized laboratories.         Fact sheet for Healthcare Providers: ConventionUpdate.co.nz  Fact sheet for Patients: ConventionXcedexdate.co.nz       Methodology: Isothermal Nucleic Acid Amplification         COVID-19 RAPID TEST [[id number]] Collected: 08/31/21 1715    Order Status: Canceled           Labs: Results:       BMP, Mg, Phos Recent Labs     09/02/21 0622 08/31/21 1708    138   K 3.2* 3.0*   * 107   CO2 22 23   AGAP 7 8   BUN 6* 11   CREA 0.63 0.80   CA 8.1* 8.7   * 120*   MG 2.1  --       CBC Recent Labs     09/02/21 0622 08/31/21 1708   WBC 3.6* 5.3   RBC 3.98* 4.57   HGB 12.4 14.0   HCT 37.2 40.8    396   GRANS 62 68   LYMPH 24 20   EOS 2 3   MONOS 9 7   BASOS 0 0   IG 2 2   ANEU 2.2 3.6   ABL 0.9 1.1   VIKTORIYA 0.1 0.2   ABM 0.3 0.4   ABB 0.0 0.0   AIG 0.1 0.1      LFT Recent Labs     09/02/21 0622 08/31/21 1708   ALT 71* 83*   AP 47* 55   TP 6.7 7.5   ALB 2.7* 3.1*   GLOB 4.0* 4.4*   AGRAT 0.7* 0.7*      Cardiac Testing No results found for: BNPP, BNP, CPK, RCK1, RCK2, RCK3, RCK4, CKMB, CKNDX, CKND1, TROPT, TROIQ   Coagulation Tests No results found for: PTP, INR, APTT, INREXT, INREXT   A1c No results found for: HBA1C, CFR4DHTD, [id number] Lipid Panel No results found for: CHOL, CHOLPOCT, CHOLX, CHLST, CHOLV, 219852, HDL, HDLP, LDL, LDLC, DLDLP, 604010, VLDLC, VLDL, TGLX, TRIGL, TRIGP, TGLPOCT, CHHD, CHHDX   Thyroid Panel Lab Results   Component Value Date/Time    TSH 2.250 08/31/2021 05:08 PM        Most Recent UA No results found for: COLOR, APPRN, REFSG, CASPER, PROTU, GLUCU, KETU, BILU, BLDU, UROU, COLUMBA, LEUKU     No Known Allergies  Immunization History   Administered Date(s) Administered    COVID-19, PFIZER, MRNA, LNP-S, PF, 30MCG/0.3ML DOSE 08/02/2021       All Labs from Last 24 Hrs:  Recent Results (from the past 24 hour(s))   CBC WITH AUTOMATED DIFF    Collection Time: 09/02/21  6:22 AM   Result Value Ref Range    WBC 3.6 (L) 4.3 - 11.1 K/uL    RBC 3.98 (L) 4.05 - 5.2 M/uL    HGB 12.4 11.7 - 15.4 g/dL    HCT 37.2 35.8 - 46.3 %    MCV 93.5 79.6 - 97.8 FL    MCH 31.2 26.1 - 32.9 PG    MCHC 33.3 31.4 - 35.0 g/dL    RDW 13.0 11.9 - 14.6 %    PLATELET 341 430 - 377 K/uL    MPV 10.5 9.4 - 12.3 FL    ABSOLUTE NRBC 0.00 0.0 - 0.2 K/uL    DF AUTOMATED      NEUTROPHILS 62 43 - 78 %    LYMPHOCYTES 24 13 - 44 %    MONOCYTES 9 4.0 - 12.0 %    EOSINOPHILS 2 0.5 - 7.8 %    BASOPHILS 0 0.0 - 2.0 %    IMMATURE GRANULOCYTES 2 0.0 - 5.0 %    ABS. NEUTROPHILS 2.2 1.7 - 8.2 K/UL    ABS. LYMPHOCYTES 0.9 0.5 - 4.6 K/UL    ABS. MONOCYTES 0.3 0.1 - 1.3 K/UL    ABS. EOSINOPHILS 0.1 0.0 - 0.8 K/UL    ABS. BASOPHILS 0.0 0.0 - 0.2 K/UL    ABS. IMM.  GRANS. 0.1 0.0 - 0.5 K/UL   METABOLIC PANEL, COMPREHENSIVE    Collection Time: 09/02/21  6:22 AM   Result Value Ref Range    Sodium 144 136 - 145 mmol/L    Potassium 3.2 (L) 3.5 - 5.1 mmol/L    Chloride 115 (H) 98 - 107 mmol/L    CO2 22 21 - 32 mmol/L    Anion gap 7 7 - 16 mmol/L    Glucose 127 (H) 65 - 100 mg/dL    BUN 6 (L) 8 - 23 MG/DL    Creatinine 0.63 0.6 - 1.0 MG/DL    GFR est AA >60 >60 ml/min/1.73m2    GFR est non-AA >60 >60 ml/min/1.73m2    Calcium 8.1 (L) 8.3 - 10.4 MG/DL    Bilirubin, total 0.3 0.2 - 1.1 MG/DL ALT (SGPT) 71 (H) 12 - 65 U/L    AST (SGOT) 46 (H) 15 - 37 U/L    Alk. phosphatase 47 (L) 50 - 136 U/L    Protein, total 6.7 6.3 - 8.2 g/dL    Albumin 2.7 (L) 3.2 - 4.6 g/dL    Globulin 4.0 (H) 2.3 - 3.5 g/dL    A-G Ratio 0.7 (L) 1.2 - 3.5     MAGNESIUM    Collection Time: 09/02/21  6:22 AM   Result Value Ref Range    Magnesium 2.1 1.8 - 2.4 mg/dL       Discharge Exam:  Patient Vitals for the past 24 hrs:   Temp Pulse Resp BP SpO2   09/02/21 1059 98.4 °F (36.9 °C) 86 18 129/82 96 %   09/02/21 0758 98.5 °F (36.9 °C) 80 18 137/72 95 %   09/02/21 0545 98.2 °F (36.8 °C) 96 18 137/72 96 %   09/02/21 0041 98.5 °F (36.9 °C) 97 18 138/61 97 %   09/01/21 1936 98.4 °F (36.9 °C) 93 18 131/76 97 %     Oxygen Therapy  O2 Sat (%): 96 % (09/02/21 1059)  O2 Device: None (Room air) (09/02/21 1120)  No intake or output data in the 24 hours ending 09/02/21 1925    General:    Well nourished. Alert. No distress. Eyes:   Normal sclera. Extraocular movements intact. ENT:  Normocephalic, atraumatic. Moist mucous membranes  CV:   Regular rate and rhythm. No murmur, rub, or gallop. Lungs:  Minimal coarse breath sounds  Abdomen: Soft, nontender, nondistended. Bowel sounds normal.   Extremities: Warm and dry. No cyanosis or edema. Neurologic: CN II-XII grossly intact. Sensation intact. Skin:     No rashes or jaundice. Psych:  Normal mood and affect. Discharge Info:   Discharge Medication List as of 9/2/2021  2:45 PM      START taking these medications    Details   aspirin 81 mg chewable tablet Take 1 Tablet by mouth daily. , Normal, Disp-30 Tablet, R-0      potassium chloride (K-DUR, KLOR-CON) 20 mEq tablet Take 1 Tablet by mouth daily. , Normal, Disp-4 Tablet, R-0      cefpodoxime (VANTIN) 200 mg tablet Take 1 Tablet by mouth two (2) times a day., Normal, Disp-10 Tablet, R-0         CONTINUE these medications which have CHANGED    Details   losartan (COZAAR) 100 mg tablet Take 1 Tablet by mouth daily. , Normal, Disp-30 Tablet, R-0         CONTINUE these medications which have NOT CHANGED    Details   celecoxib (CELEBREX) 100 mg capsule TAKE 1 Capsule BY MOUTH EVERY DAY WITH FOOD, Historical Med      Synthroid 88 mcg tablet TAKE 1 TABLET BY MOUTH EVERY DAY (DOSE CHANGE), Historical Med, KASIA      omeprazole (PRILOSEC) 40 mg capsule Take 40 mg by mouth daily. , Historical Med      traZODone (DESYREL) 50 mg tablet TAKE 1 TO 2 TABLETS BY MOUTH EVERY NIGHT AT BEDTIME AS NEEDED FOR SLEEP, Historical Med      estradioL (ESTRACE) 1 mg tablet Take 1 mg by mouth daily. , Historical Med      PARoxetine (PAXIL) 20 mg tablet Take 20 mg by mouth daily. , Historical Med      metoclopramide HCl (REGLAN) 10 mg tablet Take 10 mg by mouth Before breakfast, lunch, dinner and at bedtime. , Historical Med         STOP taking these medications       hydroCHLOROthiazide (HYDRODIURIL) 25 mg tablet Comments:   Reason for Stopping:                 Disposition: home with home health   Activity: as per PT  Diet: No diet orders on file2 GM LOW SODIUM DIET    Follow-up Appointments   Procedures    FOLLOW UP VISIT Appointment in: One Week Follow up with pcp in a week with bmp. Hold blood pressure medication if systolic less then 801 mmhg. Come back to er if oxygen saturation less then 90% on room air or any new confusion. Avoid celebrex if p. .. Follow up with pcp in a week with bmp. Hold blood pressure medication if systolic less then 427 mmhg. Come back to er if oxygen saturation less then 90% on room air or any new confusion. Avoid celebrex if possible. If have take it , take it with food. Advised to take aspirin 81 mg po q daily. Risk of gastritis with asa and celebrex. Continue omeprazole. Standing Status:   Standing     Number of Occurrences:   1     Order Specific Question:   Appointment in     Answer:    One Week         Follow-up Information     Follow up With Specialties Details Why [address]  In 1 week Needs  [facility]  [phone]          Time spent in patient discharge planning and coordination 25 minutes.     Signed:  Kaylynn Vasquez MD

## 2021-09-02 NOTE — PROGRESS NOTES
ACUTE OT GOALS:  (Developed with and agreed upon by patient and/or caregiver.)  1. Patient will complete lower body bathing and dressing with MOD I and adaptive equipment as needed. 2.Patient will complete upper body bathing and dressing with MOD  I and adaptive equipment as needed. 3. Patient will complete toileting with MOD I.   4. Patient will tolerate 25 minutes of OT treatment with 1-2 rest breaks to increase activity tolerance for ADLs. 5. Patient will complete functional transfers with MOD I and adaptive equipment as needed. 6. Patient will complete functional activity with MOD I and adaptive equipment as needed.     Timeframe: 7 visits     OCCUPATIONAL THERAPY: Daily Note OT Treatment Day # 2    Ainsley Linton is a 62 y.o. female   PRIMARY DIAGNOSIS: <principal problem not specified>  Altered mental status [R41.82]  Pneumonia due to COVID-19 virus [U07.1, J12.82]       Payor: /   ASSESSMENT:     REHAB RECOMMENDATIONS: CURRENT LEVEL OF FUNCTION:  (Most Recently Demonstrated)   Recommendation to date pending progress:  Settin [facility] with 24/7 supervision from family   Equipment:    To Be Determined Bathin [address] in standing, SBA in sitting   Dressin [address]  Feeding/Groomin [address]:  [address]:  [address] [place] [address],[address]:  Ms. Marc Starks is progressing well towards OT goals. Today, pt was received supine in bed with daughter at bedside. Pt very tearful the entire session and wanting to go home. Completed bed mobility supervision. Sit>stand and functional mobility CGA. Toileting, LB bathing, and grooming tasks standing at sink CGA. Continued grooming tasks and UB dressing seated EOB SBA. Ms. Marc Starks continues to demonstrate overall deficits in strength, balance, activity tolerance, and ADL performance.  Continue OT efforts and POC in order to address functional deficits and OT goals stated above. SUBJECTIVE:   Ms. Moody Stuart states, \"Ii want to go home. \"    SOCIAL HISTORY/LIVING ENVIRONMENT:        OBJECTIVE:     PAIN: VITAL SIGNS: LINES/DRAINS:   Pre Treatment: Pain Screen  Pain Scale 1: Numeric (0 - 10)  Pain Intensity 1: 0  Post Treatment: no change  Vital Signs  O2 Device: None (Room air) none  O2 Device: None (Room air)     ACTIVITIES OF DAILY LIVING: I Mod I S SBA CGA Min Mod Max Total NT Comments   BASIC ADLs:              Bathing/ Showering [] [] [] [x] [x] [] [] [] [] [] CGA for LB in standing, SBA for UB seated EOB   Toileting [] [] [] [] [x] [] [] [] [] []    Dressing [] [] [] [x] [x] [] [] [] [] [] CGA to doff and loco brief/pants, SBA to doff and loco shirt sitting EOB   Feeding [] [] [] [] [] [] [] [] [] [x]    Grooming [] [] [] [x] [x] [] [] [] [] [] CGA to wash hands standing at sink, SBA to wash face and apply deodorant sitting EOB   Personal Device Care [] [] [] [] [] [] [] [] [] [x]    Functional Mobility [] [] [] [] [x] [] [] [] [] [] No AD   I=Independent, Mod I=Modified Independent, S=Supervision, SBA=Standby Assistance, CGA=Contact Guard Assistance,   Min=Minimal Assistance, Mod=Moderate Assistance, Max=Maximal Assistance, Total=Total Assistance, NT=Not Tested    MOBILITY: I Mod I S SBA CGA Min Mod Max Total  NT x2 Comments:   Supine to sit [] [] [x] [] [] [] [] [] [] [] []    Sit to supine [] [] [] [] [] [] [] [] [] [x] [] Left sitting in chair    Sit to stand [] [] [] [] [x] [] [] [] [] [] []    Bed to chair [] [] [] [] [x] [] [] [] [] [] [] No AD   I=Independent, Mod I=Modified Independent, S=Supervision, SBA=Standby Assistance, CGA=Contact Guard Assistance,   Min=Minimal Assistance, Mod=Moderate Assistance, Max=Maximal Assistance, Total=Total Assistance, NT=Not Tested    BALANCE: Good Fair+ Fair Fair- Poor NT Comments   Sitting Static [x] [] [] [] [] []    Sitting Dynamic [] [x] [] [] [] []              Standing Static [] [x] [] [] [] []    Standing Dynamic [] [x] [] [] [] []      PLAN:   FREQUENCY/DURATION: OT Plan of Care: 3 times/week for duration of hospital stay or until stated goals are met, whichever comes first.    TREATMENT:   TREATMENT:   ($$ Self Care/Home Management: 23-37 mins    )  Self Care (30 Minutes): Self care including Upper Body Bathing, Lower Body Bathing, Toileting, Upper Body Dressing, Lower Body Dressing, Grooming and Energy Conservation Training to increase independence and decrease level of assistance required.     TREATMENT GRID:  N/A    AFTER TREATMENT POSITION/PRECAUTIONS:  Chair, Needs within reach, RN notified and Visitors at bedside    INTERDISCIPLINARY COLLABORATION:  RN/PCT and OT/MERCER    TOTAL TREATMENT DURATION:  OT Patient Time In/Time Out  Time In: 1120  Time Out: [address] OT

## 2021-09-02 NOTE — PROGRESS NOTES
Problem: Falls - Risk of  Goal: *Absence of Falls  Description: Document Payton Spare Fall Risk and appropriate interventions in the flowsheet. Outcome: Progressing Towards Goal  Note: Fall Risk Interventions:  Mobility Interventions: Patient to call before getting OOB    Mentation Interventions: Adequate sleep, hydration, pain control    Medication Interventions: Teach patient to arise slowly    Elimination Interventions: Patient to call for help with toileting needs              Problem: Patient Education: Go to Patient Education Activity  Goal: Patient/Family Education  Outcome: Progressing Towards Goal     Problem: Airway Clearance - Ineffective  Goal: Achieve or maintain patent airway  Outcome: Progressing Towards Goal     Problem: Gas Exchange - Impaired  Goal: Absence of hypoxia  Outcome: Progressing Towards Goal  Goal: Promote optimal lung function  Outcome: Progressing Towards Goal     Problem: Breathing Pattern - Ineffective  Goal: Ability to achieve and maintain a regular respiratory rate  Outcome: Progressing Towards Goal     Problem:  Body Temperature -  Risk of, Imbalanced  Goal: Ability to maintain a body temperature within defined limits  Outcome: Progressing Towards Goal  Goal: Will regain or maintain usual level of consciousness  Outcome: Progressing Towards Goal  Goal: Complications related to the disease process, condition or treatment will be avoided or minimized  Outcome: Progressing Towards Goal     Problem: Isolation Precautions - Risk of Spread of Infection  Goal: Prevent transmission of infectious organism to others  Outcome: Progressing Towards Goal     Problem: Nutrition Deficits  Goal: Optimize nutrtional status  Outcome: Progressing Towards Goal     Problem: Risk for Fluid Volume Deficit  Goal: Maintain normal heart rhythm  Outcome: Progressing Towards Goal  Goal: Maintain absence of muscle cramping  Outcome: Progressing Towards Goal  Goal: Maintain normal serum potassium, sodium, calcium, phosphorus, and pH  Outcome: Progressing Towards Goal     Problem: Loneliness or Risk for Loneliness  Goal: Demonstrate positive use of time alone when socialization is not possible  Outcome: Progressing Towards Goal     Problem: Fatigue  Goal: Verbalize increase energy and improved vitality  Outcome: Progressing Towards Goal     Problem: Patient Education: Go to Patient Education Activity  Goal: Patient/Family Education  Outcome: Progressing Towards Goal     Problem: Pressure Injury - Risk of  Goal: *Prevention of pressure injury  Description: Document Phill Scale and appropriate interventions in the flowsheet.   Outcome: Progressing Towards Goal     Problem: Patient Education: Go to Patient Education Activity  Goal: Patient/Family Education  Outcome: Progressing Towards Goal     Problem: Patient Education: Go to Patient Education Activity  Goal: Patient/Family Education  Outcome: Progressing Towards Goal     Problem: Patient Education: Go to Patient Education Activity  Goal: Patient/Family Education  Outcome: Progressing Towards Goal

## 2021-09-02 NOTE — PROGRESS NOTES
Pt A/O at time of discharge. VSS and IV removed without complications. Opportunity given to voice concerns/questions. Pt taken by wheelchair to front lobby for discharge. Belongings taken down by daughter.

## 2021-09-02 NOTE — PROGRESS NOTES
The patient is medically stable for discharge. CM spoke with the patient's daughter Migdalia Goldmann [phone], regarding discharge needs. Daughter confirmed family will assist with supportive care needs and will obtain discharge medications. Daughter also confirmed with patient, [facility] services are not needed. Patient to discharge home with [facility]. Patient's daughter Sanjay Davis to transport the patient home. Please consult or notify CM if any needs shall arise. CM remains available. Care Management Interventions  PCP Verified by CM: Yes ([address] [place] [address] in [facility]. )  Mode of Transport at Discharge: Other (see comment) (Patient's Daughter Sanjay Davis. )  Transition of Care Consult (CM Consult): Discharge Planning  Discharge Durable Medical Equipment: No  Physical Therapy Consult: No  Occupational Therapy Consult: No  Speech Therapy Consult: No  Current Support Network: Relative's Home  Confirm Follow Up Transport: Family (Patient is able to drive. )  The Plan for Transition of Care is Related to the Following Treatment Goals : Return to Prior Level of Functioning.    The Patient and/or Patient Representative was Provided with a Choice of Provider and Agrees with the Discharge Plan?: Yes  [place] Resource Information Provided?: No  Discharge Location  Discharge Placement: Home with family assistance

## 2021-09-03 LAB
BACTERIA SPEC CULT: NORMAL
BACTERIA SPEC CULT: NORMAL
SERVICE CMNT-IMP: NORMAL

## 2021-09-06 LAB
BACTERIA SPEC CULT: NORMAL
SERVICE CMNT-IMP: NORMAL

## 2022-03-18 PROBLEM — J12.82 PNEUMONIA DUE TO COVID-19 VIRUS: Status: ACTIVE | Noted: 2021-09-01

## 2022-03-18 PROBLEM — U07.1 PNEUMONIA DUE TO COVID-19 VIRUS: Status: ACTIVE | Noted: 2021-09-01

## 2022-03-19 PROBLEM — G93.41 ACUTE METABOLIC ENCEPHALOPATHY: Status: ACTIVE | Noted: 2021-09-01

## 2022-03-19 PROBLEM — I10 ESSENTIAL HYPERTENSION: Status: ACTIVE | Noted: 2021-09-01

## 2022-03-19 PROBLEM — E87.20 LACTIC ACIDOSIS: Status: ACTIVE | Noted: 2021-09-01

## 2022-03-19 PROBLEM — R41.82 ALTERED MENTAL STATUS: Status: ACTIVE | Noted: 2021-09-01

## 2022-03-19 PROBLEM — E87.6 HYPOKALEMIA: Status: ACTIVE | Noted: 2021-09-01
